# Patient Record
Sex: MALE | URBAN - METROPOLITAN AREA
[De-identification: names, ages, dates, MRNs, and addresses within clinical notes are randomized per-mention and may not be internally consistent; named-entity substitution may affect disease eponyms.]

---

## 2022-08-08 ENCOUNTER — ANESTHESIA (OUTPATIENT)
Dept: INTERVENTIONAL RADIOLOGY/VASCULAR | Age: 67
DRG: 024 | End: 2022-08-08
Payer: MEDICARE

## 2022-08-08 ENCOUNTER — APPOINTMENT (OUTPATIENT)
Dept: CT IMAGING | Age: 67
DRG: 024 | End: 2022-08-08
Payer: MEDICARE

## 2022-08-08 ENCOUNTER — ANESTHESIA EVENT (OUTPATIENT)
Dept: INTERVENTIONAL RADIOLOGY/VASCULAR | Age: 67
DRG: 024 | End: 2022-08-08
Payer: MEDICARE

## 2022-08-08 ENCOUNTER — HOSPITAL ENCOUNTER (OUTPATIENT)
Age: 67
Discharge: HOME | End: 2022-08-08
Payer: MEDICARE

## 2022-08-08 ENCOUNTER — HOSPITAL ENCOUNTER (INPATIENT)
Age: 67
LOS: 3 days | Discharge: HOME | DRG: 024 | End: 2022-08-11
Attending: EMERGENCY MEDICINE | Admitting: PSYCHIATRY & NEUROLOGY
Payer: MEDICARE

## 2022-08-08 ENCOUNTER — APPOINTMENT (OUTPATIENT)
Dept: INTERVENTIONAL RADIOLOGY/VASCULAR | Age: 67
DRG: 024 | End: 2022-08-08
Payer: MEDICARE

## 2022-08-08 DIAGNOSIS — I63.9 STROKE ABORTED BY ADMINISTRATION OF THROMBOLYTIC AGENT (HCC): ICD-10-CM

## 2022-08-08 DIAGNOSIS — I63.9 CEREBROVASCULAR ACCIDENT (CVA), UNSPECIFIED MECHANISM (HCC): Primary | ICD-10-CM

## 2022-08-08 LAB
ABO/RH: NORMAL
ABO/RH: NORMAL
ABSOLUTE EOS #: 0.15 K/UL (ref 0–0.44)
ABSOLUTE IMMATURE GRANULOCYTE: <0.03 K/UL (ref 0–0.3)
ABSOLUTE LYMPH #: 1.03 K/UL (ref 1.1–3.7)
ABSOLUTE MONO #: 0.47 K/UL (ref 0.1–1.2)
ANION GAP SERPL CALCULATED.3IONS-SCNC: 12 MMOL/L (ref 9–17)
ANTIBODY SCREEN: NEGATIVE
ARM BAND NUMBER: NORMAL
BASOPHILS # BLD: 1 % (ref 0–2)
BASOPHILS ABSOLUTE: 0.03 K/UL (ref 0–0.2)
BUN BLDV-MCNC: 15 MG/DL (ref 8–23)
CALCIUM SERPL-MCNC: 9.2 MG/DL (ref 8.6–10.4)
CHLORIDE BLD-SCNC: 106 MMOL/L (ref 98–107)
CO2: 23 MMOL/L (ref 20–31)
CREAT SERPL-MCNC: 0.84 MG/DL (ref 0.7–1.2)
EOSINOPHILS RELATIVE PERCENT: 3 % (ref 1–4)
EXPIRATION DATE: NORMAL
FIBRINOGEN: 234 MG/DL (ref 140–420)
GFR AFRICAN AMERICAN: >60 ML/MIN
GFR NON-AFRICAN AMERICAN: >60 ML/MIN
GFR SERPL CREATININE-BSD FRML MDRD: ABNORMAL ML/MIN/{1.73_M2}
GLUCOSE BLD-MCNC: 129 MG/DL (ref 70–99)
HCT VFR BLD CALC: 41.7 % (ref 40.7–50.3)
HEMOGLOBIN: 15.3 G/DL (ref 13–17)
IMMATURE GRANULOCYTES: 0 %
INR BLD: 1.1
LYMPHOCYTES # BLD: 19 % (ref 24–43)
MCH RBC QN AUTO: 37.4 PG (ref 25.2–33.5)
MCHC RBC AUTO-ENTMCNC: 36.7 G/DL (ref 28.4–34.8)
MCV RBC AUTO: 102 FL (ref 82.6–102.9)
MONOCYTES # BLD: 9 % (ref 3–12)
MYOGLOBIN: 204 NG/ML (ref 28–72)
NRBC AUTOMATED: 0 PER 100 WBC
PARTIAL THROMBOPLASTIN TIME: 22.7 SEC (ref 20.5–30.5)
PDW BLD-RTO: 13.5 % (ref 11.8–14.4)
PLATELET # BLD: 82 K/UL (ref 138–453)
PMV BLD AUTO: 9.6 FL (ref 8.1–13.5)
POTASSIUM SERPL-SCNC: 3.8 MMOL/L (ref 3.7–5.3)
PROTHROMBIN TIME: 11.6 SEC (ref 9.1–12.3)
RBC # BLD: 4.09 M/UL (ref 4.21–5.77)
SEG NEUTROPHILS: 68 % (ref 36–65)
SEGMENTED NEUTROPHILS ABSOLUTE COUNT: 3.64 K/UL (ref 1.5–8.1)
SODIUM BLD-SCNC: 141 MMOL/L (ref 135–144)
TOTAL CK: 92 U/L (ref 39–308)
TROPONIN, HIGH SENSITIVITY: <6 NG/L (ref 0–22)
WBC # BLD: 5.3 K/UL (ref 3.5–11.3)

## 2022-08-08 PROCEDURE — 2580000003 HC RX 258: Performed by: STUDENT IN AN ORGANIZED HEALTH CARE EDUCATION/TRAINING PROGRAM

## 2022-08-08 PROCEDURE — 2580000003 HC RX 258: Performed by: PSYCHIATRY & NEUROLOGY

## 2022-08-08 PROCEDURE — 6360000002 HC RX W HCPCS: Performed by: STUDENT IN AN ORGANIZED HEALTH CARE EDUCATION/TRAINING PROGRAM

## 2022-08-08 PROCEDURE — 70496 CT ANGIOGRAPHY HEAD: CPT

## 2022-08-08 PROCEDURE — 84484 ASSAY OF TROPONIN QUANT: CPT

## 2022-08-08 PROCEDURE — 2709999900 HC NON-CHARGEABLE SUPPLY

## 2022-08-08 PROCEDURE — 85730 THROMBOPLASTIN TIME PARTIAL: CPT

## 2022-08-08 PROCEDURE — 86901 BLOOD TYPING SEROLOGIC RH(D): CPT

## 2022-08-08 PROCEDURE — 82550 ASSAY OF CK (CPK): CPT

## 2022-08-08 PROCEDURE — 03CG3ZZ EXTIRPATION OF MATTER FROM INTRACRANIAL ARTERY, PERCUTANEOUS APPROACH: ICD-10-PCS | Performed by: PSYCHIATRY & NEUROLOGY

## 2022-08-08 PROCEDURE — 2500000003 HC RX 250 WO HCPCS: Performed by: PSYCHIATRY & NEUROLOGY

## 2022-08-08 PROCEDURE — C1769 GUIDE WIRE: HCPCS

## 2022-08-08 PROCEDURE — A0425 GROUND MILEAGE: HCPCS

## 2022-08-08 PROCEDURE — 99285 EMERGENCY DEPT VISIT HI MDM: CPT

## 2022-08-08 PROCEDURE — A0427 ALS1-EMERGENCY: HCPCS

## 2022-08-08 PROCEDURE — C1757 CATH, THROMBECTOMY/EMBOLECT: HCPCS

## 2022-08-08 PROCEDURE — 70450 CT HEAD/BRAIN W/O DYE: CPT

## 2022-08-08 PROCEDURE — 80048 BASIC METABOLIC PNL TOTAL CA: CPT

## 2022-08-08 PROCEDURE — 2000000000 HC ICU R&B

## 2022-08-08 PROCEDURE — 61645 PERQ ART M-THROMBECT &/NFS: CPT | Performed by: PSYCHIATRY & NEUROLOGY

## 2022-08-08 PROCEDURE — 2500000003 HC RX 250 WO HCPCS: Performed by: STUDENT IN AN ORGANIZED HEALTH CARE EDUCATION/TRAINING PROGRAM

## 2022-08-08 PROCEDURE — 85025 COMPLETE CBC W/AUTO DIFF WBC: CPT

## 2022-08-08 PROCEDURE — 3700000000 HC ANESTHESIA ATTENDED CARE

## 2022-08-08 PROCEDURE — 2580000003 HC RX 258

## 2022-08-08 PROCEDURE — 93005 ELECTROCARDIOGRAM TRACING: CPT | Performed by: STUDENT IN AN ORGANIZED HEALTH CARE EDUCATION/TRAINING PROGRAM

## 2022-08-08 PROCEDURE — 96374 THER/PROPH/DIAG INJ IV PUSH: CPT

## 2022-08-08 PROCEDURE — 2500000003 HC RX 250 WO HCPCS

## 2022-08-08 PROCEDURE — 85384 FIBRINOGEN ACTIVITY: CPT

## 2022-08-08 PROCEDURE — 82553 CREATINE MB FRACTION: CPT

## 2022-08-08 PROCEDURE — P9073 PLATELETS PHERESIS PATH REDU: HCPCS

## 2022-08-08 PROCEDURE — 37799 UNLISTED PX VASCULAR SURGERY: CPT

## 2022-08-08 PROCEDURE — 6360000004 HC RX CONTRAST MEDICATION: Performed by: PSYCHIATRY & NEUROLOGY

## 2022-08-08 PROCEDURE — 3700000001 HC ADD 15 MINUTES (ANESTHESIA)

## 2022-08-08 PROCEDURE — 83874 ASSAY OF MYOGLOBIN: CPT

## 2022-08-08 PROCEDURE — 6360000004 HC RX CONTRAST MEDICATION: Performed by: STUDENT IN AN ORGANIZED HEALTH CARE EDUCATION/TRAINING PROGRAM

## 2022-08-08 PROCEDURE — 99223 1ST HOSP IP/OBS HIGH 75: CPT | Performed by: PSYCHIATRY & NEUROLOGY

## 2022-08-08 PROCEDURE — C1887 CATHETER, GUIDING: HCPCS

## 2022-08-08 PROCEDURE — C1894 INTRO/SHEATH, NON-LASER: HCPCS

## 2022-08-08 PROCEDURE — 61645 PERQ ART M-THROMBECT &/NFS: CPT

## 2022-08-08 PROCEDURE — 86900 BLOOD TYPING SEROLOGIC ABO: CPT

## 2022-08-08 PROCEDURE — C1760 CLOSURE DEV, VASC: HCPCS

## 2022-08-08 PROCEDURE — 6360000002 HC RX W HCPCS

## 2022-08-08 PROCEDURE — 86850 RBC ANTIBODY SCREEN: CPT

## 2022-08-08 PROCEDURE — 85610 PROTHROMBIN TIME: CPT

## 2022-08-08 PROCEDURE — 36430 TRANSFUSION BLD/BLD COMPNT: CPT

## 2022-08-08 RX ORDER — SODIUM CHLORIDE 9 MG/ML
INJECTION, SOLUTION INTRAVENOUS PRN
Status: COMPLETED | OUTPATIENT
Start: 2022-08-08 | End: 2022-08-08

## 2022-08-08 RX ORDER — POLYETHYLENE GLYCOL 3350 17 G/17G
17 POWDER, FOR SOLUTION ORAL DAILY PRN
Status: CANCELLED | OUTPATIENT
Start: 2022-08-08

## 2022-08-08 RX ORDER — ONDANSETRON 4 MG/1
4 TABLET, ORALLY DISINTEGRATING ORAL EVERY 8 HOURS PRN
Status: CANCELLED | OUTPATIENT
Start: 2022-08-08

## 2022-08-08 RX ORDER — SODIUM CHLORIDE, SODIUM LACTATE, POTASSIUM CHLORIDE, CALCIUM CHLORIDE 600; 310; 30; 20 MG/100ML; MG/100ML; MG/100ML; MG/100ML
INJECTION, SOLUTION INTRAVENOUS CONTINUOUS PRN
Status: DISCONTINUED | OUTPATIENT
Start: 2022-08-08 | End: 2022-08-08 | Stop reason: SDUPTHER

## 2022-08-08 RX ORDER — IODIXANOL 320 MG/ML
100 INJECTION, SOLUTION INTRAVASCULAR
Status: COMPLETED | OUTPATIENT
Start: 2022-08-08 | End: 2022-08-08

## 2022-08-08 RX ORDER — LIDOCAINE HYDROCHLORIDE 10 MG/ML
INJECTION, SOLUTION EPIDURAL; INFILTRATION; INTRACAUDAL; PERINEURAL PRN
Status: DISCONTINUED | OUTPATIENT
Start: 2022-08-08 | End: 2022-08-08 | Stop reason: SDUPTHER

## 2022-08-08 RX ORDER — FOLIC ACID 5 MG/ML
1 INJECTION, SOLUTION INTRAMUSCULAR; INTRAVENOUS; SUBCUTANEOUS DAILY
Status: DISCONTINUED | OUTPATIENT
Start: 2022-08-08 | End: 2022-08-08

## 2022-08-08 RX ORDER — ATORVASTATIN CALCIUM 80 MG/1
80 TABLET, FILM COATED ORAL NIGHTLY
Status: CANCELLED | OUTPATIENT
Start: 2022-08-08

## 2022-08-08 RX ORDER — 0.9 % SODIUM CHLORIDE 0.9 %
INTRAVENOUS SOLUTION INTRAVENOUS CONTINUOUS PRN
Status: COMPLETED | OUTPATIENT
Start: 2022-08-08 | End: 2022-08-08

## 2022-08-08 RX ORDER — ONDANSETRON 2 MG/ML
4 INJECTION INTRAMUSCULAR; INTRAVENOUS EVERY 6 HOURS PRN
Status: CANCELLED | OUTPATIENT
Start: 2022-08-08

## 2022-08-08 RX ORDER — HYDRALAZINE HYDROCHLORIDE 20 MG/ML
10 INJECTION INTRAMUSCULAR; INTRAVENOUS EVERY 30 MIN PRN
Status: CANCELLED | OUTPATIENT
Start: 2022-08-08

## 2022-08-08 RX ORDER — SODIUM CHLORIDE 9 MG/ML
INJECTION, SOLUTION INTRAVENOUS CONTINUOUS
Status: CANCELLED | OUTPATIENT
Start: 2022-08-08

## 2022-08-08 RX ORDER — FENTANYL CITRATE 50 UG/ML
INJECTION, SOLUTION INTRAMUSCULAR; INTRAVENOUS PRN
Status: DISCONTINUED | OUTPATIENT
Start: 2022-08-08 | End: 2022-08-08 | Stop reason: SDUPTHER

## 2022-08-08 RX ORDER — LORAZEPAM 2 MG/ML
1 INJECTION INTRAMUSCULAR EVERY 4 HOURS PRN
Status: DISCONTINUED | OUTPATIENT
Start: 2022-08-08 | End: 2022-08-11 | Stop reason: HOSPADM

## 2022-08-08 RX ORDER — NICARDIPINE HYDROCHLORIDE 0.1 MG/ML
INJECTION INTRAVENOUS CONTINUOUS PRN
Status: DISCONTINUED | OUTPATIENT
Start: 2022-08-08 | End: 2022-08-08 | Stop reason: SDUPTHER

## 2022-08-08 RX ORDER — LABETALOL HYDROCHLORIDE 5 MG/ML
10 INJECTION, SOLUTION INTRAVENOUS EVERY 10 MIN PRN
Status: CANCELLED | OUTPATIENT
Start: 2022-08-08

## 2022-08-08 RX ORDER — ACETAMINOPHEN 325 MG/1
650 TABLET ORAL EVERY 4 HOURS PRN
Status: DISCONTINUED | OUTPATIENT
Start: 2022-08-08 | End: 2022-08-11 | Stop reason: HOSPADM

## 2022-08-08 RX ORDER — ONDANSETRON 4 MG/1
4 TABLET, ORALLY DISINTEGRATING ORAL EVERY 8 HOURS PRN
Status: DISCONTINUED | OUTPATIENT
Start: 2022-08-08 | End: 2022-08-11 | Stop reason: HOSPADM

## 2022-08-08 RX ORDER — ONDANSETRON 2 MG/ML
4 INJECTION INTRAMUSCULAR; INTRAVENOUS EVERY 6 HOURS PRN
Status: DISCONTINUED | OUTPATIENT
Start: 2022-08-08 | End: 2022-08-11 | Stop reason: HOSPADM

## 2022-08-08 RX ORDER — LABETALOL HYDROCHLORIDE 5 MG/ML
10 INJECTION, SOLUTION INTRAVENOUS
Status: DISCONTINUED | OUTPATIENT
Start: 2022-08-08 | End: 2022-08-11

## 2022-08-08 RX ADMIN — THIAMINE HYDROCHLORIDE 500 MG: 100 INJECTION, SOLUTION INTRAMUSCULAR; INTRAVENOUS at 22:41

## 2022-08-08 RX ADMIN — FENTANYL CITRATE 50 MCG: 50 INJECTION, SOLUTION INTRAMUSCULAR; INTRAVENOUS at 15:11

## 2022-08-08 RX ADMIN — SODIUM CHLORIDE: 9 INJECTION, SOLUTION INTRAVENOUS at 15:09

## 2022-08-08 RX ADMIN — FOLIC ACID 1 MG: 5 INJECTION, SOLUTION INTRAMUSCULAR; INTRAVENOUS; SUBCUTANEOUS at 20:42

## 2022-08-08 RX ADMIN — IODIXANOL 48 ML: 320 INJECTION, SOLUTION INTRAVASCULAR at 16:37

## 2022-08-08 RX ADMIN — Medication 2 G: at 15:26

## 2022-08-08 RX ADMIN — TENECTEPLASE 25 MG: KIT at 13:57

## 2022-08-08 RX ADMIN — FENTANYL CITRATE 50 MCG: 50 INJECTION, SOLUTION INTRAMUSCULAR; INTRAVENOUS at 16:25

## 2022-08-08 RX ADMIN — LIDOCAINE HYDROCHLORIDE 1 MG: 10 INJECTION, SOLUTION EPIDURAL; INFILTRATION; INTRACAUDAL; PERINEURAL at 15:11

## 2022-08-08 RX ADMIN — SODIUM CHLORIDE, POTASSIUM CHLORIDE, SODIUM LACTATE AND CALCIUM CHLORIDE: 600; 310; 30; 20 INJECTION, SOLUTION INTRAVENOUS at 15:09

## 2022-08-08 RX ADMIN — IOPAMIDOL 90 ML: 755 INJECTION, SOLUTION INTRAVENOUS at 14:36

## 2022-08-08 RX ADMIN — SODIUM CHLORIDE 2.5 MG/HR: 9 INJECTION, SOLUTION INTRAVENOUS at 20:47

## 2022-08-08 RX ADMIN — FENTANYL CITRATE 50 MCG: 50 INJECTION, SOLUTION INTRAMUSCULAR; INTRAVENOUS at 16:03

## 2022-08-08 RX ADMIN — Medication 10 MG: at 18:52

## 2022-08-08 RX ADMIN — SODIUM CHLORIDE 1000 ML: 9 INJECTION, SOLUTION INTRAVENOUS at 14:00

## 2022-08-08 RX ADMIN — NICARDIPINE HYDROCHLORIDE 5 MG/HR: 0.1 INJECTION INTRAVENOUS at 15:48

## 2022-08-08 RX ADMIN — FENTANYL CITRATE 50 MCG: 50 INJECTION, SOLUTION INTRAMUSCULAR; INTRAVENOUS at 15:33

## 2022-08-08 NOTE — ED NOTES
Pt. Arrives to ED via mobile stroke for c/o stroke. EMS reports that the patient was at work and went to use the restroom, when he did not return other employees went to look for him and was found on the toilet. Last known well was 12:55 TKA was administered at 1335. Upon arrival to the ED patient was flaccid on the right side, right sided mouth droop and eyes deviated to the left.   Patient was taken to [facility] upon arrival        [place], RN  08/08/22 61 [facility] JODY Oconnell  08/08/22 [id number]

## 2022-08-08 NOTE — ED PROVIDER NOTES
[facility]  Emergency Department  Faculty Attestation     I performed a history and physical examination of the patient and discussed management with the resident. I reviewed the residents note and agree with the documented findings and plan of care. Any areas of disagreement are noted on the chart. I was personally present for the key portions of any procedures. I have documented in the chart those procedures where I was not present during the key portions. I have reviewed the emergency nurses triage note. I agree with the chief complaint, past medical history, past surgical history, allergies, medications, social and family history as documented unless otherwise noted below. For Physician Assistant/ Nurse Practitioner cases/documentation I have personally evaluated this patient and have completed at least one if not all key elements of the E/M (history, physical exam, and MDM). Additional findings are as noted. Primary Care Physician:  No primary care provider on file. Screenings:  [unfilled]    CHIEF COMPLAINT       Chief Complaint   Patient presents with    Cerebrovascular Accident       RECENT VITALS:   Temp: 98.2 °F (36.8 °C),  Heart Rate: 85, Resp: 20, BP: (!) 156/90    LABS:  Labs Reviewed   STROKE PANEL       Radiology  CT HEAD WO CONTRAST   Preliminary Result   Positive dense MCA sign in the M1 segment of the left MCA. Although gray-white differentiation is grossly maintained, there is likely   acute ischemia in the left MCA territory. CT HEAD WO CONTRAST   Final Result   Question of positive dense MCA sign in the M1 segment of the left MCA. Further evaluation with CTA head is recommended. Although gray-white differentiation is grossly maintained, early acute   ischemia in the left MCA territory cannot be excluded.          CTA HEAD NECK W CONTRAST    (Results Pending)   IR ANGIOGRAM CAROTID C EREBRAL BILATERAL    (Results Pending) CRITICAL CARE: There was  a high probability of clinically significant/life threatening deterioration in this patient's condition which required my urgent intervention. Total critical care time was 5 minutes. This excludes any time for separately reportable procedures. EKG:  EKG Interpretation    Interpreted by me    Rhythm: normal sinus   Rate: normal  Axis: normal  Ectopy: none  Conduction: Slightly prolonged QTC  ST Segments: Subtle depressions inferiorly  T Waves: Inversion V2, V3  Q Waves: none    Clinical Impression: Specific ST and T wave changes    Attending Physician Additional  Notes    Patient is brought by mobile stroke unit for stroke. Last normal was sometime after noon, see their detailed chart. He has aphasia, left-sided gaze, right hemiparesis, and a stroke score of 20. He was given TNKase prior to arrival.  CT brain is without blood or edema. CT angiogram shows clot. Neurology team is here evaluating patient and will be taking patient to the Cath Lab. He has normal vital signs, he follows commands in most examples. Speech is slightly dysarthric. He has good strength left upper extremity but has difficulty counting fingers and following commands regarding squeezing. He is in no respiratory distress. EKG shows questionable ischemic changes though no STEMI. Impression is acute stroke. Plan is continue fluids, admission to neuro ICU, patient is being taken to the Cath Lab. Klely Lucas.  Linnette Kenney MD, [facility]  Attending Emergency  Physician               Nain Wynne MD  08/08/22 [id number]

## 2022-08-08 NOTE — ANESTHESIA PROCEDURE NOTES
Arterial Line:    An arterial line was placed using surface landmarks, in the OR for the following indication(s): continuous blood pressure monitoring and blood sampling needed. A 20 gauge (size), 1 and 3/4 inch (length), Arrow (type) catheter was placed, Seldinger technique used, into the right radial artery, secured by tape. Anesthesia type: General    Events:  patient tolerated procedure well with no complications. 8/8/2022 3:10 PM8/8/2022 3:15 PM  Anesthesiologist: Ceferino Vaca MD  Performed: Anesthesiologist   Preanesthetic Checklist  Completed: patient identified, IV checked, site marked, risks and benefits discussed, surgical/procedural consents, equipment checked, pre-op evaluation, timeout performed, anesthesia consent given, oxygen available, monitors applied/VS acknowledged, fire risk safety assessment completed and verbalized and blood product R/B/A discussed and consented

## 2022-08-08 NOTE — CONSULTS
Endovascular Neurosurgery Consult      Reason for evaluation: Left MCA stroke, Left MCA M-1 occlusion     SUBJECTIVE:   History of Chief Complaint:    Mr.Douglas Recardo Rubinstein is a 78 y/o M with pmh for Htn, Alcohol abuse presented with acute onset right arm, leg weakness as well as speech difficulty. Patient was apparently at his usual state of health earlier this morning. At around 12:55 PM, patient went to the restroom at his workplace, did not come out. Coworkers later found him on commode, not moving his right side and not talking. Mobile stroke unit was called, patient received IV tenecteplase in mobile stroke unit 25 mg at 1357. Patient's initial NIH stroke scale was 17. CT head was completed in mobile stroke unit, showed left MCA hyperdense sign  CTA head upon arrival, showed left M1 occlusion. Endovascular team was consulted for emergent mechanical thrombectomy. Allergies  has no allergies on file. Medications  Prior to Admission medications    Not on File    Scheduled Meds:  Continuous Infusions:  PRN Meds:.  Past Medical History   has no past medical history on file. Past Surgical History   has no past surgical history on file. Social History   has no history on file for tobacco use.   has no history on file for alcohol use.   has no history on file for drug use. Family History  family history is not on file.     Review of Systems:  CONSTITUTIONAL:  negative for fevers, chills, fatigue and malaise    EYES:  negative for double vision, blurred vision and photophobia     HEENT:  negative for tinnitus, epistaxis and sore throat    RESPIRATORY:  negative for cough, shortness of breath, wheezing    CARDIOVASCULAR:  negative for chest pain, palpitations, syncope, edema    GASTROINTESTINAL:  negative for nausea, vomiting    GENITOURINARY:  negative for incontinence    MUSCULOSKELETAL:  negative for neck or back pain    NEUROLOGICAL:  Negative for weakness and tingling  negative for headaches and dizziness    PSYCHIATRIC:  negative for anxiety      Review of systems otherwise negative. OBJECTIVE:     Vitals:    22 1442   BP: (!) 156/90   Pulse: 85   Resp: 20   Temp: 98.2 °F (36.8 °C)   SpO2: 96%        General:  Gen: normal habitus, NAD  HEENT: NCAT, mucosa moist  Cvs: RRR, S1 S2 normal  Resp: symmetric unlabored breathing  Abd: s/nd/nt  Ext: no edema  Skin: no lesions seen, warm and dry    Neuro:  Gen: awake and alert, oriented x1  Lang/speech: Mild to moderate aphasia, dysarthria  CN: PERRL, EOMI, Left gaze deviation,Right HH , V1-3 intact, Right facial droop hearing intact, shoulder shrug symmetric, tongue midline  Motor: Right UE and Le 0/5   Sense:reduced sensation touch on right UE and LE   Coord: impaired sensation on right UE and LE   DTR: deferred  Gait: deferred     NIH Stroke Scale:   1a  Level of consciousness: 0 - alert; keenly responsive   1b. LOC questions:  1 - answers one question correctly   1c. LOC commands: 1 - performs one task correctly   2. Best Gaze: 1 - partial gaze palsy   3. Visual: 1 - partial hemianopia   4. Facial Palsy: 1 - minor paralysis (flattened nasolabial fold, asymmetric on smiling)   5a. Motor left arm: 0 - no drift, limb holds 90 (or 45) degrees for full 10 seconds   5b. Motor right arm: 4 - no movement   6a. Motor left le - no drift; leg holds 30 degree position for full 5 seconds   6b  Motor right le - no movement   7. Limb Ataxia: 2 - present in two limbs   8. Sensory: 2 - severe to total sensory loss; patient is not aware of being touched in face, arm, leg   9. Best Language:  2 - severe aphasia; all communication is through fragmentary expression; great need for inference, questioning, and guessing by the listener. Range of information that can be exchanged is limited; listener carries burden of communication. Examiner cannot identify materials provided from patient response.     10. Dysarthria: 1 - mild to moderate, patient slurs at least some words and at worst, can be understood with some difficulty   11. Extinction and Inattention: 0 - no abnormality         Total:   20     MRS: 02      LABS:   Reviewed. No results found for: HGB, WBC, PLT, NA, BUN, CREATININE, AST, ALT, MG, APTT, INR   No results found for: COVID19    RADIOLOGY:   Images were personally reviewed including:  CTA head   Left MCA M-1 proximal occlusion     ASSESSMENT:   76 y/o M with pmh for Htn, Alcohol abuse presented with acute onset right arm, leg weakness as well as speech difficulty. Patient was apparently at his usual state of health earlier this morning. At around 12:55 PM, patient went to the restroom at his workplace, did not come out. Coworkers later found him on commode, not moving his right side and not talking. Mobile stroke unit was called, patient received IV tenecteplase in mobile stroke unit 25 mg at 1357. Patient's initial NIH stroke scale was 17. CT head was completed in mobile stroke unit, showed left MCA hyperdense sign  CTA head upon arrival, showed left M1 occlusion. Emergent mechanical thrombectomy was recommended. Verbal consent was obtained from wife. PLAN:   --Emergent mechanical thrombectomy   --MAC   --A line   --SBP goal < 180 mm Hg   --ICU admission post thrombectomy     Case discussed with Dr. Manpreet Dhaliwal attending.     Gunjan Guevara MD    Stroke, [facility]  [address] [place]  Electronically signed 8/8/2022 at 3:03 PM

## 2022-08-08 NOTE — ANESTHESIA POSTPROCEDURE EVALUATION
Department of Anesthesiology  Postprocedure Note    Patient: Rosendo Chauhan  MRN: [id number]  [place]: 1955  Date of evaluation: 8/8/2022      Procedure Summary     Date: 08/08/22 Room / Location: [address] [place] [address] 83 Procedures    Anesthesia Start: 6130 Anesthesia Stop: 1450    Procedure: IR ANGIOGRAM CAROTID CEREBRAL BILATERAL Diagnosis: (thrombectomy)    Scheduled Providers:  Responsible Provider: Carolyn Lyle MD    Anesthesia Type: MAC ASA Status: 4 - Emergent          Anesthesia Type: No value filed.     Kevyn Phase I:      Kevyn Phase II:        Anesthesia Post Evaluation    Patient participation: complete - patient participated  Level of consciousness: awake and alert  Nausea & Vomiting: no vomiting and no nausea  Complications: no  Cardiovascular status: hemodynamically stable  Respiratory status: nasal cannula  Hydration status: stable

## 2022-08-08 NOTE — ANESTHESIA PRE PROCEDURE
Department of Anesthesiology  Preprocedure Note       Name:  Caron Christina   Age:  77 y.o.  :  1955                                          MRN:  [id number]         Date:  2022      Surgeon: * No surgeons listed *    Procedure: * No procedures listed *    Medications prior to admission:   Prior to Admission medications    Not on File       Current medications:    No current facility-administered medications for this encounter. No current outpatient medications on file. Facility-Administered Medications Ordered in Other Encounters   Medication Dose Route Frequency Provider Last Rate Last Admin    lactated ringers infusion   IntraVENous Continuous PRN Sims Salaam, APRN - CRNA   New Bag at 22 1509    fentaNYL (SUBLIMAZE) injection   IntraVENous PRN Sims Salaam, APRN - CRNA   50 mcg at 22 1511       Allergies:  Not on File    Problem List:  There is no problem list on file for this patient. Past Medical History:  No past medical history on file. Past Surgical History:  No past surgical history on file. Social History:    Social History     Tobacco Use    Smoking status: Not on file    Smokeless tobacco: Not on file   Substance Use Topics    Alcohol use: Not on file                                Counseling given: Not Answered      Vital Signs (Current):   Vitals:    22 1442   BP: (!) 156/90   Pulse: 85   Resp: 20   Temp: 98.2 °F (36.8 °C)   TempSrc: Oral   SpO2: 96%   Weight: 250 lb (113.4 kg)   Height: 6' 1\" (1.854 m)                                              BP Readings from Last 3 Encounters:   22 (!) 156/90       NPO Status:                                                                                 BMI:   Wt Readings from Last 3 Encounters:   22 250 lb (113.4 kg)     Body mass index is 32.98 kg/m².     CBC:   Lab Results   Component Value Date/Time    WBC 5.3 2022 02:54 PM    RBC 4.09 2022 02:54 PM    HGB 15.3 2022 02:54 PM    HCT 41.7 08/08/2022 02:54 PM    .0 08/08/2022 02:54 PM    RDW 13.5 08/08/2022 02:54 PM    PLT 82 08/08/2022 02:54 PM       CMP:   Lab Results   Component Value Date/Time    NA PENDING 08/08/2022 02:54 PM    K PENDING 08/08/2022 02:54 PM    CL PENDING 08/08/2022 02:54 PM    CO2 PENDING 08/08/2022 02:54 PM    BUN PENDING 08/08/2022 02:54 PM    CREATININE PENDING 08/08/2022 02:54 PM    GFRAA PENDING 08/08/2022 02:54 PM    LABGLOM PENDING 08/08/2022 02:54 PM    GLUCOSE PENDING 08/08/2022 02:54 PM    CALCIUM PENDING 08/08/2022 02:54 PM       POC Tests: No results for input(s): POCGLU, POCNA, POCK, POCCL, POCBUN, POCHEMO, POCHCT in the last 72 hours. Coags:   Lab Results   Component Value Date/Time    PROTIME 11.6 08/08/2022 02:54 PM    INR 1.1 08/08/2022 02:54 PM    APTT 22.7 08/08/2022 02:54 PM       HCG (If Applicable): No results found for: PREGTESTUR, PREGSERUM, HCG, HCGQUANT     ABGs: No results found for: PHART, PO2ART, SSS5JZG, GXE8WSR, BEART, F8UYBXGB     Type & Screen (If Applicable):  No results found for: LABABO, LABRH    Drug/Infectious Status (If Applicable):  No results found for: HIV, HEPCAB    COVID-19 Screening (If Applicable): No results found for: COVID19        Anesthesia Evaluation    Airway: Mallampati: II  TM distance: >3 FB   Neck ROM: full  Mouth opening: > = 3 FB   Dental: normal exam         Pulmonary: breath sounds clear to auscultation                             Cardiovascular:    (+) hypertension:,         Rhythm: regular  Rate: normal                    Neuro/Psych:   (+) CVA:,             GI/Hepatic/Renal:            ROS comment: Alcohol abuse. Endo/Other:                     Abdominal:             Vascular: Other Findings:           Anesthesia Plan      MAC     ASA 4 - emergent     (Limited history due to active CVA. No PMH in chart. Proceeding with limited information)  Induction: intravenous.   arterial line  MIPS: Postoperative opioids intended and Prophylactic antiemetics administered. Anesthetic plan and risks discussed with Unable to obtain due to emergent nature. Plan discussed with CRNA.                     Geremias Diaz MD   8/8/2022

## 2022-08-08 NOTE — ED NOTES
..  8/8/2022 6:18 PM    Patient: Liam Arias, 77 y.o., male Race: Caucasion  Patient Address: [address] [place] [address] E [address]  Incident Address:  [x] [address] [facility]     [x] [facility]  [] [facility]  [] [facility]THOPEDIC AND SPINE [facility] [place]   [] KARINA ANTHONY JR. [facility]  [] [facility]  Patient Phone #: [phone]   Insurance: Payor: /   Mora Wheeler Comp:  []  Yes   [x]  No  Emergency Contact: Extended Emergency Contact Information  Primary Emergency Contact: [address] [place] [address] 252 Phone: [phone]  Mobile Phone: [phone]  Relation: Spouse  MRN: [id number]  [facility]# [id number]  YOB: 1955  Primary Care Physician: No primary care provider on file. Advance Directive: [x] Full Code   []DNR-CC   []DNR-CCA    MSU Crew: MALDONADO Fermin- CT Tech, GARO Yip - Paramedic, Gm Ellison - JODY    Pt Transported To:  [x] [place] [address]  [] [facility]  [] [facility]  [] [facility]  [] [facility]  [] [facility]  [facility] [] [facility] [] [facility]    Was patient transported to closest facility? []Yes  [x]No (if No specify reason)   []   Patient/family request    [x]   Divert to specialist       Response Code   [] 2  [x] 3  []   Change  [] 2  [] 3   Transport Code   [x] 2  [] 3  []   Change  [] 2  [] 3     Mileage:  [address] [facility] [id number]   Total Miles 9.6     Call Received 8/8/2022 1302   Dispatched 8/8/2022 1305   Enroute 8/8/2022 1307   Arrived Scene 8/8/2022 1318   At Patient 8/8/2022 1323   Decision to Scan 8/8/2022 1324   Scan 8/8/2022 1340   Departed Scene 8/8/2022 [facility]  8/8/2022 1421   Departed Hospital 8/8/2022 1443   In Service 8/8/2022 1443         Allergies  [] Updated/Reviewed by MSU  [x] Historical Data Only  [] Unavailable  has No Known Allergies.   Medications  [] Updated/Reviewed by MSU  [x] Historical Data Only  [] Unavailable  Prior to Admission medications    Not on File      Past Medical History  [] Updated/Reviewed by MSU  [x] Historical Data Only  [] Unavailable   has a past medical history of Cirrhosis of liver ([facility] [place] [address] Hepatitis C, Hyperlipidemia, and Hypertension. Patient Weight: 250 lbs   [x]   Estimated   []   Stated          VITALS          Time BP Pulse   Resp  Rate N-normal  S-shallow  D-deep Monitor SpO2 O2    LPM BGL   Temp Pain   1328 166/108 92 18 N NSR 96  NA NA   1335 ? 91 18 N NSR 96 RA      1345  80 22 N NSR 96 RA      1355  80 18 N NSR 96 RA      1400 104/70 81 18 N NSR 96 RA      1405 123/69 76 16 N NSR 96 RA      1408 126/74 73 12 N NSR 96 RA      1415 138/77 72 20 N NSR 96 RA      1418 125/77 73 22 N  RA         Pupils GCS   Time R L E  4 V  5 M  6 T  15   1323 3 3 4 3 6 13                                           NIH -   record on all transports and Q15 min after tPA administration    NIHSS       Time 1322 1400 1415    1a. LOC - Arousal Status 0 0 0    1b. LOC - Questions 2 2 2    1c. LOC - Commands 1 1 1    2. Eye Movements (gaze) 1 1 1    3. Visual Fields 1 1 1    4. Facial Palsy 2 2 2    5a. Motor Left Arm 0 0 0    5b. Motor Right Arm 3 3 3    6a. Motor Left Leg 0 0 0    6b. Motor Right Leg 3 3 3    7. Limb Ataxia 0 0 0    8. Sensory 2 2 2    9. Language/Aphasia 2 2 2    10. Dysarthria 1 1 1    11. Neglect       TOTAL 18 18 18        Research:    RACE Score: 9 Time: 1322  StrokeVAN Score: Positive Time:1322    Time Last Known Well: 7238 08/08/2022    Narrative:    Dispatched to possible CVA per [facility]. Arrived to find Soha Barone, 77 y.o., male c/o being unresponsive. Report received from [facility] EMS at patients side.  at patient side via telemedicine unit. Pt was at work and went to the bathroom when co workers noticed he had been there for awhile. When going to check on him they found the pt on the toilet and not responding. Upon our arrival with EMS pt has a facial droop on the right, deviated gaze to the left. Pt is repeating yes to all questions asked at this time. Pt does follow commands on the left when asked. Dr. Evy Carl ordered CT scan to be completed.  Once scan was completed Dr. Radha Myers informed us of findings and ordered TNK to be given. Pt meets I/E  criteria with information available to us. Multiple attempts were made to get IV access. We were able to get access to left upper arm with IV and TNK 25 mg/5ml was given @ 1357. Report given to [place] [address] ER called a RACE alert and also gave ER our ETA    Patient received the following medications prior to MSU arrival: NA  Patient has the following lines prior to MSU arrival NA  Patient has the following airway placed prior to MSU arrival: NA    Patient was transferred to cot via 2 person assist and secured with straps x3. Zoll ECG, SpO2, and NIBP applied and monitored throughout encounter. HOB maintained at 30 degrees. Patient was transferred to ambulance, cot secured in scan position. Non contrast CT scan performed. After scan cot secured in transport position. IV thrombolytic (Alteplase or Tenecteplase) inclusion/exclusion criteria reviewed with patient and physician. Candidate for IV thrombolytic therapy? [x] Yes   [] No - due to the following exclusion criteria:    [] ICH   [] Taking anticoagulant -   [] Beyond last time known well window  [] At or returned to baseline   [] Marked improvement of symptoms  [] No disabling symptoms  [] Other -     Patient is being transported to [place] [address] . During transport patient rests comfortably. Cabin temp maintained at 70-72 °F throughout transport. Patient was transferred to bed CT scan via 4 person sheet lift.   . Patient care handoff completed with Joan Mistry RN at bedside and neuro team      Imaging:  Images were obtained onboard the MSU including:  [x] CT brain without contrast - see results below:      CT HEAD WO CONTRAST    Result Date: 8/8/2022  EXAMINATION: CT OF THE HEAD WITHOUT CONTRAST  8/8/2022 2:30 pm TECHNIQUE: CT of the head was performed without the administration of intravenous contrast. Automated exposure control, iterative reconstruction, and/or weight based adjustment of the mA/kV was utilized to reduce the radiation dose to as low as reasonably achievable. COMPARISON: None. HISTORY: ORDERING SYSTEM PROVIDED HISTORY: stroke TECHNOLOGIST PROVIDED HISTORY: stroke Decision Support Exception - unselect if not a suspected or confirmed emergency medical condition->Emergency Medical Condition (MA) FINDINGS: BRAIN/VENTRICLES: There is positive dense MCA sign in the M1 segment of the left MCA. There is no acute intracranial hemorrhage, mass effect or midline shift. No abnormal extra-axial fluid collection. The gray-white differentiation is maintained without evidence of an acute infarct. There is no evidence of hydrocephalus. ORBITS: The visualized portion of the orbits demonstrate no acute abnormality. SINUSES: The visualized paranasal sinuses and mastoid air cells demonstrate no acute abnormality. SOFT TISSUES/SKULL:  No acute abnormality of the visualized skull or soft tissues. Positive dense MCA sign in the M1 segment of the left MCA, likely related to acute thrombus occlusion. Although gray-white differentiation is grossly maintained, there is likely acute ischemia in the left MCA territory. Results were reported to Dr. Josie Gaspar at 2:47 p.m. on August 8, 2022. CT HEAD WO CONTRAST    Result Date: 8/8/2022  EXAMINATION: CT OF THE HEAD WITHOUT CONTRAST  8/8/2022 1:56 pm TECHNIQUE: CT of the head was performed without the administration of intravenous contrast. Automated exposure control, iterative reconstruction, and/or weight based adjustment of the mA/kV was utilized to reduce the radiation dose to as low as reasonably achievable. COMPARISON: None.  HISTORY: ORDERING SYSTEM PROVIDED HISTORY: stroke TECHNOLOGIST PROVIDED HISTORY: stroke Decision Support Exception - unselect if not a suspected or confirmed emergency medical condition->Emergency Medical Condition (MA) FINDINGS: BRAIN/VENTRICLES: There is question of positive dense MCA sign in the M1 segment of the left MCA. There is no acute intracranial hemorrhage, mass effect or midline shift. No abnormal extra-axial fluid collection. The gray-white differentiation is maintained without evidence of an acute infarct. There is no evidence of hydrocephalus. ORBITS: The visualized portion of the orbits demonstrate no acute abnormality. SINUSES: There are retention cysts versus polyps in the bilateral maxillary sinuses. The visualized paranasal sinuses and mastoid air cells demonstrate no acute abnormality. SOFT TISSUES/SKULL:  No acute abnormality of the visualized skull or soft tissues. Question of positive dense MCA sign in the M1 segment of the left MCA. Further evaluation with CTA head is recommended. Although gray-white differentiation is grossly maintained, early acute ischemia in the left MCA territory cannot be excluded. Physical assessment performed:    Neuro: Pt is alert but aphasic. Pt keeps repeating yes to all questions asked. Pt has deviated gaze to the left. Pt has vision field cut on the right. Pt has facial droop and dysarthria when speaking. Pt is flaccid on right side. Pt following commands on left side. Pt has sensory deficit on the right.   Resp: lungs clear to auscultation bilaterally, normal effort  Cardiac: regular rate, no murmur, 12 lead ECG shows NSR  Muscular/skeletal/peripheral vascular: no edema, no redness or tenderness in the calves, pulses present in 4 extremities   Abd/GI/: abd distended large, soft, non tender, bowel sounds present x 4 quadrants   Skin: normal color, warm, diaphoretic     IV LINES   Time Size Site # Attempts Performed By   8822 # 18 gauge RAC 1 unsuccessful S Yip   1348 #18 gauge LAC 1 unsuccessful EMS   1350 #18 gauge Left forearm 1  unsuccessful S Yip   1356 # 18 gauge Left upper arm 1 EMS       Total Amount of IV Fluids Infused: 200 ml    MEDS / ELECTRICAL RX   Time Therapy Dosage Route Response Performed By   1400  0.9% NS 1 l IV SY Mc Thrombolytic AdministrationTNK    Time Bolus Dose Infusion Dose Waste   1357 5 ml     NA  NA 5 ml       [x] thrombolytic dosing double checked by: Maris Alcazar RN  S Yip Paramedic      ACUTE STROKE DYSPHAGIA SCREEN              YES NO   1 GCS less than 13?         2 Facial Asymmetry or Weakness? 3 Tongue Asymmetry or Weakness? 4 Palatal Asymmetry or Weakness? 5 Signs of aspiration during 3oz water test?*                 If answers to questions 1-4 are NO proceed to 3oz water test               *Administer 3oz of water for sequential drinks, note any throat clearing, cough, or change in vocal quality immediately after and 1 minute following the swallow. If answers to questions 1-5 are NO proceed with ordered PO meds       POC LABS      TIME NA     Test (reference range)      FSBG ()      PT (11-13.5)      INR (0.8-1.1)      Na (138-146)      K (3.5-4.9)      Cl ()      iCa (1.2-1.32)      TCO2 (24-29)      Glu ()      BUN (8.0-26)      Crea (0.6-1. 3)      Hct (38-51)      Hb (12.0-17)      AnGap 10.0-20)                Assistance:   [x]    Fire - Medic 41    []    Police    [x]    Other EMS (See Below)     # 410 hospitals Notification:    [facility] -  [x] [facility]  [] [facility]  [] [facility]  [] [facility]  [] [facility] [] [facility] [] [facility] [] [facility]  [] [employer]     [x]    Med Channel:     []    Cell Phone     Med Control Orders Received:   [x] No  []  Yes:     Med Control Physician (if on line medical direction received)  -        Hospital Team Alert:   []    Trauma Alert    []    STEMI Alert         []    Stroke Alert    [x]    RACE Alert      Description Of Valuables: glasses and drivers license    Patient Valuables:   [x]    With Patient    []    Not Received    []    ER / Lab     Call Outcome:   [x]    Transport to Facility    []    Care Transferred to [facility]    []    Cancelled    []    Patient Refusal    [] Mobile Stroke Unit    Electronically signed by Julio Russell, RN on 8/8/22 at 6:18 PM CORRINE Pino RN  08/08/22 9206

## 2022-08-08 NOTE — SEDATION DOCUMENTATION
Pt arrives to Neuro IR alert to self, follows commands on the left, facial droop, aphasia improving some, RUE/RLE no movement to pain and right neglect. NIH 15  Beck inserted without difficulty. Pedal pulses palpable and marked. Right radial art line per anesthesia.  Continue to closely monitor

## 2022-08-08 NOTE — H&P
Neuro ICU History & Physical    Patient Name: Ton Scales  Patient : 1955  Room/Bed: SHARMILA/SHARMILA  Code Status: Full code  Allergies: Not on File    CHIEF COMPLAINT     Right-sided weakness    HPI    History Obtained From: Chart    The patient is a 77 y.o. male presented with PMH of hypertension, acute onset of right arm and leg weakness along with impaired speech. Patient was last known well at approximately 1255. According to documents, patient went to the restroom at his workplace and did not come out. Workers found the patient on the commode not moving the right side and not able to communicate. Mobile stroke unit was called and patient subsequently received TKA at approximately 2 PM.  NIH was roughly 17. CTA of the head and neck showed a left 1 occlusion which was initially noted on CT head with a left hyperdense sign. Patient was taken for emergent thrombectomy. TICI 3, first-pass. Postprocedure, patient does display mild aphasia. He does also display subtle right sided weakness. No history of stroke. He is a daily smoker for over 40 years. Unclear if patient is on any antiplatelet therapy. Also does not recall what transpired and appears mildly confused. Patient is fully functional and able to perform his daily activities without any impairment. Admitted to ICU From: ER  Reason for ICU Admission: Ischemic stroke       PATIENT HISTORY   Past Medical History:    No past medical history on file. Past Surgical History:    No past surgical history on file.     Social History:   Social History     Socioeconomic History    Marital status: Single     Spouse name: Not on file    Number of children: Not on file    Years of education: Not on file    Highest education level: Not on file   Occupational History    Not on file   Tobacco Use    Smoking status: Not on file    Smokeless tobacco: Not on file   Substance and Sexual Activity    Alcohol use: Not on file    Drug use: Not on file Sexual activity: Not on file   Other Topics Concern    Not on file   Social History Narrative    Not on file     Social Determinants of Health     Financial Resource Strain: Not on file   Food Insecurity: Not on file   Transportation Needs: Not on file   Physical Activity: Not on file   Stress: Not on file   Social Connections: Not on file   Intimate Partner Violence: Not on file   Housing Stability: Not on file       Family History:   No family history on file. Allergies:    Patient has no allergy information on record. Medications Prior to Admission:    Not in a hospital admission. Current Medications:  No current facility-administered medications for this encounter. Facility-Administered Medications Ordered in Other Encounters: lactated ringers infusion, , IntraVENous, Continuous PRN  fentaNYL (SUBLIMAZE) injection, , IntraVENous, PRN  ceFAZolin (ANCEF) 2000 mg in sterile water 20 mL IV syringe, , IntraVENous, PRN  niCARdipine (CARDENE) 20 mg in 0.9 % sodium chloride 200 mL solution, , IntraVENous, Continuous PRN    REVIEW OF SYSTEMS     CONSTITUTIONAL: negative for fatigue and malaise   EYES: negative for double vision and photophobia    HEENT: negative for tinnitus and sore throat   RESPIRATORY: negative for cough, shortness of breath   CARDIOVASCULAR: negative for chest pain, palpitations, or syncope   GASTROINTESTINAL: negative for abdominal pain, nausea, vomiting, diarrhea, or constipation    GENITOURINARY: negative for incontinence or retention    MUSCULOSKELETAL: negative for neck or back pain, negative for extremity pain   NEUROLOGICAL: Negative for seizures, headaches, weakness, numbness, confusion, aphasia, no dysarthria, mild aphasia.    PSYCHIATRIC: negative for agitation, hallucination, SI/HI   SKIN Negative for spontaneous contusions, rashes, or lesions      PHYSICAL EXAM:     BP (!) 156/94   Pulse 71   Temp 98.2 °F (36.8 °C) (Oral)   Resp 28   Ht 6' 1\" (1.854 m)   Wt 250 lb (113.4 kg) seconds  5b. Motor right arm:  1 - drift, limb holds 90 (or 45) degrees but drifts down before full 10 seconds: does not hit bed  6a. Motor left le - no drift; leg holds 30 degree position for full 5 seconds  6b. Motor right le - no drift; leg holds 30 degree position for full 5 seconds  7. Limb Ataxia:  0 - absent  8. Sensory:  0 - normal; no sensory loss  9. Best Language:  1 - mild to moderate aphasia; some obvious loss of fluency or facility of comprehension without significant limitation on ideas expressed or form of expression. Reduction of speech and/or comprehension, however, makes conversation about provided materials difficult or impossible. For example, in conversation about provided materials, examiner can identify picture or naming card content from patient's response. 10.  Dysarthria:  0 - normal  11.   Extinction and Inattention:  0 - no abnormality    LABS AND IMAGING:     RECENT LABS:  CBC with Differential:    Lab Results   Component Value Date/Time    WBC 5.3 2022 02:54 PM    RBC 4.09 2022 02:54 PM    HGB 15.3 2022 02:54 PM    HCT 41.7 2022 02:54 PM    PLT 82 2022 02:54 PM    .0 2022 02:54 PM    MCH 37.4 2022 02:54 PM    MCHC 36.7 2022 02:54 PM    RDW 13.5 2022 02:54 PM    LYMPHOPCT 19 2022 02:54 PM    MONOPCT 9 2022 02:54 PM    BASOPCT 1 2022 02:54 PM    MONOSABS 0.47 2022 02:54 PM    LYMPHSABS 1.03 2022 02:54 PM    EOSABS 0.15 2022 02:54 PM    BASOSABS 0.03 2022 02:54 PM     BMP:    Lab Results   Component Value Date/Time     2022 02:54 PM    K 3.8 2022 02:54 PM     2022 02:54 PM    CO2 23 2022 02:54 PM    BUN 15 2022 02:54 PM    CREATININE 0.84 2022 02:54 PM    CALCIUM 9.2 2022 02:54 PM    GFRAA >60 2022 02:54 PM    LABGLOM >60 2022 02:54 PM    GLUCOSE 129 2022 02:54 PM       RADIOLOGY: CT Labs and Images reviewed with:    [x] Dr Kenyatta Del Rio MD    [] Burton Starks MD  [] Cherie Cedeño MD  --[] there are no new interval images to review. ASSESSMENT AND PLAN:         ASSESSMENT:     This is a 77 y.o. male with sudden onset of right-sided weakness and impaired speech. NIH was roughly 17. Mobile stroke evaluation showed a hyperdense left MCA. Patient received tPA and K after negative CT with the mobile stroke unit. Went for thrombectomy and will be proceeding to ICU after procedure. Etiology remains unclear, will continue to monitor along with continuation of stroke work-up. Patient care will be discussed with attending, will reevaluate patient along with attending. PLAN/MEDICAL DECISION MAKING:      NEUROLOGIC: Ischemic stroke s/p TKA and thrombectomy  - Imaging continue to monitor for any neurological changes  -Repeat CT in a.m.  -SBP goal   -Monitor for any neurological changes  -Stroke work-up including. -TSH/A1c/lipid profile  -Echocardiogram with bubble study  -PT/OT/SLP evaluation  -Repeat CT in a.m. and MRI in a.m.  -We will start antiplatelet therapy after 24-hour and negative imaging    CARDIOVASCULAR:  - Goal SBP 90-1 20  - Continue telemetry as directed  -Echocardiogram with bubble study  -Labetalol/hydralazine as needed if BP out of parameters  -Nicardipine drip if BP persistently elevated    PULMONARY:  -Monitor for any change in patient's respiratory status  -Baseline chest x-ray    RENAL/FLUID/ELECTROLYTE:  -Follow-up BUNs/creatinine  -Monitor urine output  -NS at 75  - Daily BMP    GI/NUTRITION:  NUTRITION:  No diet orders on file  - Bowel regimen:  In place  - GI prophylaxis:     ID:  -Monitor for hypo and hyperthermia  -Tylenol as needed if T-max greater than 101  -Panculture if febrile  - Daily CBC    HEME:   -Follow-up CBC in a.m.  -Follow-up platelet count      ENDOCRINE:  - Continue to monitor blood glucose, goal <180    OTHER:  - PT/OT/ST pending    PROPHYLAXIS:  Stress ulcer: Not indicated    DVT PROPHYLAXIS:  - SCD sleeves - Thigh High   - MICHEAL stockings - Thigh High  - No chemoprophylaxis anticoagulation at this time.     DISPOSITION: ICU      Ant Le MD  Neuro Critical Care Service   Pager [phone]  8/8/2022     4:13 PM

## 2022-08-08 NOTE — FLOWSHEET NOTE
[facility]     Emergency/Trauma Note    PATIENT NAME: Srinivasa Colbert    Shift date: 8/8/2022   Shift day: Monday   Shift # 1    Room # SHARMILA/SHARMILA   Name: Srinivasa Colbert            Age: 77 y.o. Gender: male          Scientologist: No Amish on file   Place of Orthodoxy:     Trauma/Incident type:  RACE allert  Admit Date & Time: 8/8/2022  2:23 PM  TRAUMA NAME:     ADVANCE DIRECTIVES IN CHART? No    NAME OF DECISION MAKER: Tye Denise    RELATIONSHIP OF DECISION MAKER TO PATIENT: Wife, by Kathrynokpao     PATIENT/EVENT DESCRIPTION:  Srinivasa Colbert is a 77 y.o. male who arrived via Mobile Stroke Unit from place of employment ([employer] in [place]) as a RACE alert. Pt's coworkers found him down in the bathroom. Pt to be admitted to SHARMILA/SHARMILA. SPIRITUAL ASSESSMENT-INTERVENTION-OUTCOME:   unable to assess patient.  left message for wife. PATIENT BELONGINGS:  With patient    ANY BELONGINGS OF SIGNIFICANT VALUE NOTED:      REGISTRATION STAFF NOTIFIED? No      WHAT IS YOUR SPIRITUAL CARE PLAN FOR THIS PATIENT?:   Chaplains will remain available to offer spiritual and emotional support as needed.      Electronically signed by Lisbet Monet, on 8/8/2022 at 3:11 PM.  Dejuan Rodrigez  [phone]

## 2022-08-08 NOTE — ED NOTES
Pt transported to endovascular lab on monitor with stroke team     Mary Alice Heck RN  08/08/22 2591

## 2022-08-08 NOTE — ED PROVIDER NOTES
[address]  ED  Emergency Department Encounter  Emergency Medicine Resident     Pt Name: Yanni Thornton  MRN: [id number]  [place]frush 1955  Date of evaluation: 8/8/22  PCP:  No primary care provider on file. CHIEF COMPLAINT       Chief Complaint   Patient presents with    Cerebrovascular Accident       HISTORY OFPRESENT ILLNESS  (Location/Symptom, Timing/Onset, Context/Setting, Quality, Duration, Modifying Factors,Severity.)      Yanni Thortnon is a 77 y.o. male who presents with aphasia, left-sided gaze, right hemiparesis with last known well around noon. Patient was at work and his coworker saw him going to the restroom around noon. His coworkers noticed he was in there for a long time and was not answering the door so they barged in. He was slumped over with the above symptoms. EMS was called and patient was brought in by mobile stroke unit. He was given TNKase prior to arrival after negative CT brain. We have no information on his past medical history and patient is unable to participate in providing medical history given his aphasia. PAST MEDICAL / SURGICAL / SOCIAL / FAMILY HISTORY      has a past medical history of Cirrhosis of liver ([address] Hepatitis C, Hyperlipidemia, and Hypertension. has no past surgical history on file. Social:  reports that he has been smoking cigarettes. He started smoking about 55 years ago. He has a 54.00 pack-year smoking history. He has never used smokeless tobacco. He reports current alcohol use of about 32.0 standard drinks per week. He reports current drug use. Drug: Marijuana Arielle Danielle). Family Hx:   Family History   Problem Relation Age of Onset    Heart Disease Father     Alcohol Abuse Brother         Allergies:  Patient has no known allergies. Home Medications:  Prior to Admission medications    Medication Sig Start Date End Date Taking? Authorizing Provider   aspirin 81 MG chewable tablet Take 1 tablet by mouth in the morning. 8/12/22  Yes Nghia Funez MD   atorvastatin (LIPITOR) 80 MG tablet Take 1 tablet by mouth nightly 8/11/22  Yes Nghia Funez MD   losartan (COZAAR) 50 MG tablet Take 1 tablet by mouth in the morning. 8/12/22  Yes Nghia Funez MD       REVIEW OFSYSTEMS    (2-9 systems for level 4, 10 or more for level 5)      Review of Systems   Unable to perform ROS: Patient nonverbal     PHYSICAL EXAM   (up to 7 for level 4, 8 or more forlevel 5)      INITIAL VITALS:   Vitals:    08/11/22 1640   BP:    Pulse:    Resp:    Temp: (!) 57.2 °F (14 °C)   SpO2:         Physical Exam  Vitals and nursing note reviewed. Constitutional:       General: He is not in acute distress. Appearance: Normal appearance. He is normal weight. He is not ill-appearing, toxic-appearing or diaphoretic. HENT:      Nose: Nose normal.      Mouth/Throat:      Mouth: Mucous membranes are moist.      Pharynx: Oropharynx is clear. Eyes:      Extraocular Movements: Extraocular movements intact. Conjunctiva/sclera: Conjunctivae normal.      Pupils: Pupils are equal, round, and reactive to light. Cardiovascular:      Rate and Rhythm: Normal rate and regular rhythm. Pulses: Normal pulses. Heart sounds: Normal heart sounds. Pulmonary:      Effort: Pulmonary effort is normal.      Breath sounds: Normal breath sounds. Abdominal:      General: There is no distension. Palpations: Abdomen is soft. Tenderness: There is no abdominal tenderness. There is no right CVA tenderness, left CVA tenderness or guarding. Musculoskeletal:         General: Normal range of motion. Cervical back: Normal range of motion and neck supple. Skin:     General: Skin is warm. Capillary Refill: Capillary refill takes less than 2 seconds. Neurological:      General: No focal deficit present. Mental Status: He is alert. GCS: GCS eye subscore is 4. GCS verbal subscore is 2. GCS motor subscore is 6.       Cranial Nerves: Dysarthria and facial asymmetry present. Sensory: Sensory deficit present. Motor: Weakness present. Coordination: Coordination abnormal.   Psychiatric:         Mood and Affect: Mood normal.         Behavior: Behavior normal.       MEDICAL DECISION MAKING     Initial MDM/Plan: 77 y.o. male who presents with left gaze, right upper extremity right lower extremity hemiparesis, facial droop, aphasia with last known well at noon. Brought in by mobile stroke unit, stroke alert called and patient received TNKase prior to arrival.  Initial NIH score 19. No known past medical history. Vital signs reviewed and are within normal limits although temperature is inaccurately recorded as 57.2 Fahrenheit. Physical examination as above with NIH scale below. Patient to be admitted to neuro ICU since he was given TNKase. Will obtain CT head, CTA head/neck, chest x-ray, EKG and stroke panel work-up. .    INITIAL NIH STROKE SCALE    Time Performed:  7850 PM    Administer stroke scale items in the order listed. Record performance in each category after each subscale exam. Do not go back and change scores. Follow directions provided for each exam technique. Scores should reflect what the patient does, not what the clinician thinks the patient can do. The clinician should record answers while administering the exam and work quickly. Except where indicated, the patient should not be coached (i.e., repeated requests to patient to make a special effort). 1a.  Level of consciousness:  0 - alert; keenly responsive  1b. Level of consciousness questions:  2 - answers neither question correctly  1c. Level of consciousness questions:  2 - performs neither task correctly  2. Best Gaze:  1 - partial gaze palsy  3. Visual:  0 - no visual loss  4. Facial Palsy:  2 - partial paralysis (total or near total paralysis of the lower face)  5a. Motor left arm:  0 - no drift, limb holds 90 (or 45) degrees for full 10 seconds  5b.   Motor right arm:  3 - no effort against gravity, limb falls  6a. Motor left le - no drift; leg holds 30 degree position for full 5 seconds  6b. Motor right leg:  3 - no effort against gravity; leg falls to bed immediately  7. Limb Ataxia:  1 - present in one limb  8. Sensory:  0 - normal; no sensory loss  9. Best Language:  2 - severe aphasia; all communication is through fragmentary expression; great need for inference, questioning, and guessing by the listener. Range of information that can be exchanged is limited; listener carries burden of communication. Examiner cannot identify materials provided from patient response. 10.  Dysarthria:  2 - severe; patient speech is so slurred as to be unintelligible in the absence of or our of proportion to any dysphagia, or is mute/anarthric   11.   Extinction and Inattention:  1 - visual, tactile, auditory, spatial or personal inattention or extinction to bilateral simultaneous stimulation in one of the sensory modalities     TOTAL:  19      Bruno Coma Scale  Eye Opening: Spontaneous  Best Verbal Response: Oriented  Best Motor Response: Obeys commands  [place] Coma Scale Score: 15    DIAGNOSTIC RESULTS / EMERGENCYDEPARTMENT COURSE / MDM     LABS:  Labs Reviewed   STROKE PANEL - Abnormal; Notable for the following components:       Result Value    Glucose 129 (*)     RBC 4.09 (*)     MCH 37.4 (*)     MCHC 36.7 (*)     Platelets 82 (*)     Seg Neutrophils 68 (*)     Lymphocytes 19 (*)     Absolute Lymph # 1.03 (*)     Myoglobin 204 (*)     All other components within normal limits   CBC WITH AUTO DIFFERENTIAL - Abnormal; Notable for the following components:    RBC 3.77 (*)     Hematocrit 38.6 (*)     MCH 37.7 (*)     MCHC 36.8 (*)     Platelets 89 (*)     Seg Neutrophils 69 (*)     Lymphocytes 21 (*)     All other components within normal limits   BASIC METABOLIC PANEL - Abnormal; Notable for the following components:    Glucose 121 (*)     Creatinine 0.61 (*) Calcium 8.5 (*)     All other components within normal limits   URINALYSIS WITH MICROSCOPIC - Abnormal; Notable for the following components:    Color, UA Dark Yellow (*)     Bilirubin Urine NEGATIVE  Verified by ictotest. (*)     Ketones, Urine TRACE (*)     Specific Gravity, UA 1.032 (*)     Urine Hgb MODERATE (*)     Protein, UA 1+ (*)     Leukocyte Esterase, Urine SMALL (*)     All other components within normal limits   CBC WITH AUTO DIFFERENTIAL - Abnormal; Notable for the following components:    RBC 3.75 (*)     Hematocrit 38.9 (*)     .7 (*)     MCH 36.3 (*)     MCHC 35.0 (*)     Platelets 71 (*)     All other components within normal limits   BASIC METABOLIC PANEL W/ REFLEX TO MG FOR LOW K - Abnormal; Notable for the following components:    Creatinine 0.50 (*)     Calcium 8.4 (*)     Chloride 108 (*)     Anion Gap 8 (*)     All other components within normal limits   FIBRINOGEN   HEMOGLOBIN A1C   LIPID PANEL   TYPE AND SCREEN   PREPARE PLATELETS   ABO/RH   BLOOD BANK SPECIMEN         RADIOLOGY:  MRI BRAIN W WO CONTRAST   Final Result   1. Acute infarction in the left basal ganglia. 2. Additional small foci of acute infarction involving the right frontal   lobe, bilateral parietal lobes, and left temporal lobe, suggesting an embolic   process. 3. Microangiopathic change. IR ANGIOGRAM CAROTID C EREBRAL BILATERAL         CTA HEAD NECK W CONTRAST   Final Result   Occlusion of the M1 segment of the left MCA starting from its origin, with a   few patent M2 and distal branches in the left MCA territory. This finding is   likely related to acute thrombus. Subtle decreased gray-white differentiation in the left MCA territory,   particularly in the left basal ganglia and along the left insula cortex,   likely related to acute ischemia. Otherwise, no acute abnormality or flow-limiting stenosis in the remainder of   the major arteries of the head and neck.       Partially visualized 8.2 mm lung nodule in the right upper lobe. Follow-up   recommendation is listed below. Results were reported to Dr. Parris Willson at 2:47 p.m. on August 8, 2022. RECOMMENDATIONS:   Fleischner Society guidelines for follow-up and management of incidentally   detected pulmonary nodules:      Single Solid Nodule:      Nodule size greater than 8 mm   In a low-risk patient, consider CT, PET/CT, or tissue sampling at 3 months. In a high-risk patient, consider CT, PET/CT, or tissue sampling at 3 months. - Low risk patients include individuals with minimal or absent history of   smoking and other known risk factors. - High risk patients include individuals with a history or smoking or known   risk factors. Radiology 2017 http://pubs. rsna.org/doi/full/10.1148/radiol. 3414301354         CT HEAD WO CONTRAST   Final Result   Positive dense MCA sign in the M1 segment of the left MCA, likely related to   acute thrombus occlusion. Although gray-white differentiation is grossly maintained, there is likely   acute ischemia in the left MCA territory. Results were reported to Dr. Parris Willson at 2:47 p.m. on August 8, 2022. CT HEAD WO CONTRAST   Final Result   Question of positive dense MCA sign in the M1 segment of the left MCA. Further evaluation with CTA head is recommended. Although gray-white differentiation is grossly maintained, early acute   ischemia in the left MCA territory cannot be excluded. PROCEDURES:  None    CONSULTS:  IP CONSULT TO CARDIOLOGY  IP CONSULT TO IV TEAM      FINAL IMPRESSION      1.  Cerebrovascular accident (CVA), unspecified mechanism ([address]    2. Stroke aborted by administration of thrombolytic agent [facility])          DISPOSITION / [address] Admitted 08/08/2022 03:53:35 PM      PATIENT REFERRED TO:  Marisol Willoughby MD  [address]  [phone]    Schedule an appointment as soon as possible for a visit in 2 week(s)      Hortencia Kirkland MD  [address] [place] [address],  [address]  MOB # [address] [place] [id number] [facility]    Schedule an appointment as soon as possible for a visit in 3 month(s)      [address] [address]  [phone]  Follow up      [address] [address] [phone]        DISCHARGE MEDICATIONS:  Discharge Medication List as of 8/11/2022  3:33 PM        START taking these medications    Details   aspirin 81 MG chewable tablet Take 1 tablet by mouth in the morning., Disp-30 tablet, R-3Print      atorvastatin (LIPITOR) 80 MG tablet Take 1 tablet by mouth nightly, Disp-30 tablet, R-0Print      losartan (COZAAR) 50 MG tablet Take 1 tablet by mouth in the morning., Disp-30 tablet, R-0Print             Lay Oliveira MD  Emergency Medicine Resident    (Please note that portions of this note were completed with a voice recognition program.Efforts were made to edit the dictations but occasionally words are mis-transcribed.)        Lay Oliveira MD  Resident  08/17/22 0745

## 2022-08-08 NOTE — CONSULTS
Social Connections: Not on file   Intimate Partner Violence: Not on file   Housing Stability: Not on file       Family History:   No family history on file. Allergies:  Patient has no allergy information on record. Home Medications:  Prior to Admission medications    Not on File       Current Medications:   No current facility-administered medications for this encounter. Facility-Administered Medications Ordered in Other Encounters: lactated ringers infusion, , IntraVENous, Continuous PRN  fentaNYL (SUBLIMAZE) injection, , IntraVENous, PRN  ceFAZolin (ANCEF) 2000 mg in sterile water 20 mL IV syringe, , IntraVENous, PRN  niCARdipine (CARDENE) 20 mg in 0.9 % sodium chloride 200 mL solution, , IntraVENous, Continuous PRN    REVIEW OF SYSTEMS:     Unable to assess due to patient mentation    PHYSICAL EXAM:       BP (!) 156/94   Pulse 71   Temp 98.2 °F (36.8 °C) (Oral)   Resp 28   Ht 6' 1\" (1.854 m)   Wt 250 lb (113.4 kg)   SpO2 96%   BMI 32.98 kg/m²     General:  Gen: normal habitus, NAD  HEENT: NCAT, mucosa moist  Cvs: RRR, S1 S2 normal  Resp: symmetric unlabored breathing  Abd: s/nd/nt  Ext: no edema  Skin: no lesions seen, warm and dry     Neuro:  Gen: awake and alert, oriented x1  Lang/speech: Mild to moderate aphasia, dysarthria  CN: PERRL, EOMI, Left gaze deviation,Right HH , V1-3 intact, Right facial droop hearing intact, shoulder shrug symmetric, tongue midline  Motor: Right UE and Le 0/5   Sense:reduced sensation touch on right UE and LE   Coord: impaired sensation on right UE and LE   DTR: deferred  Gait: deferred      NIH Stroke Scale:  1a Level of consciousness: 0 - alert; keenly responsive   1b. LOC questions:  1 - answers one question correctly   1c. LOC commands: 1 - performs one task correctly   2. Best Gaze: 1 - partial gaze palsy   3. Visual: 1 - partial hemianopia   4. Facial Palsy: 1 - minor paralysis (flattened nasolabial fold, asymmetric on smiling)   5a.  Motor left arm: 0 - no drift, limb holds 90 (or 45) degrees for full 10 seconds   5b. Motor right arm: 4 - no movement   6a. Motor left le - no drift; leg holds 30 degree position for full 5 seconds   6b Motor right le - no movement   7. Limb Ataxia: 2 - present in two limbs   8. Sensory: 2 - severe to total sensory loss; patient is not aware of being touched in face, arm, leg   9. Best Language:  2 - severe aphasia; all communication is through fragmentary expression; great need for inference, questioning, and guessing by the listener. Range of information that can be exchanged is limited; listener carries burden of communication. Examiner cannot identify materials provided from patient response. 10. Dysarthria: 1 - mild to moderate, patient slurs at least some words and at worst, can be understood with some difficulty   11.  Extinction and Inattention: 0 - no abnormality             Total:   20      MRS: 02      LABS AND IMAGING:     CBC with Differential:    Lab Results   Component Value Date/Time    WBC 5.3 2022 02:54 PM    RBC 4.09 2022 02:54 PM    HGB 15.3 2022 02:54 PM    HCT 41.7 2022 02:54 PM    PLT 82 2022 02:54 PM    .0 2022 02:54 PM    MCH 37.4 2022 02:54 PM    MCHC 36.7 2022 02:54 PM    RDW 13.5 2022 02:54 PM    LYMPHOPCT 19 2022 02:54 PM    MONOPCT 9 2022 02:54 PM    BASOPCT 1 2022 02:54 PM    MONOSABS 0.47 2022 02:54 PM    LYMPHSABS 1.03 2022 02:54 PM    EOSABS 0.15 2022 02:54 PM    BASOSABS 0.03 2022 02:54 PM     BMP:    Lab Results   Component Value Date/Time     2022 02:54 PM    K 3.8 2022 02:54 PM     2022 02:54 PM    CO2 23 2022 02:54 PM    BUN 15 2022 02:54 PM    CREATININE 0.84 2022 02:54 PM    CALCIUM 9.2 2022 02:54 PM    GFRAA >60 2022 02:54 PM    LABGLOM >60 2022 02:54 PM    GLUCOSE 129 2022 02:54 PM         ASSESSMENT AND PLAN: Patient Active Problem List   Diagnosis    Stroke aborted by administration of thrombolytic agent [facility])       70-year-old male with PMH of HTN, alcohol abuse, presented with acute onset right arm, leg weakness as well as speech difficulty, last known well was 12:55 PM, went to restroom at his workplace, did not came out, coworkers later found him on commode, not moving his right side, not talking, mobile stroke unit was called, patient received TN K at 0, NIH was 16 at that time completed and mobile stroke unit, CT showed left MCA hyperdense sign, CTA head and neck: Showed left M1 occlusion, emergent thrombectomy was recommended, verbal consent was obtained from wife    Case discussed with Dr. Krys Allen, emergent mechanical thrombectomy, A-line, SBP<180, ICU admission post thrombectomy        Sabrina Madera MD   8/8/2022  4:14 PM

## 2022-08-08 NOTE — BRIEF OP NOTE
Brief Postoperative Note                  [facility]    NEUROENDOVASCULAR SERVICE: POST-OP NOTE: 8/8/2022    Pt Name: Soha Barone  MRN: [id number]  [place]: 1955  Date of Procedure: 8/8/2022  Primary Care Physician: No primary care provider on file. Referring Marley Espana MD      Pre-Procedural Diagnosis:Left MCA ischemic stroke, Left M-1 occlusion   Post-Procedural Diagnosis:Same as above       Procedure Performed:Cerebral angiogram with mechanical thrombectomy of the Left MCA M-1 segment     Surgeon:   Audra Benito MD    Fellow:  Gunjan Guevara MD     Assisting Tech:  Rosamaria Song    PRE-PROCEDURAL EXAM:  Prestroke baseline mRS MODIFIED LANEY SCORE: 0 - No symptoms at all.  , \"Neurological exam performed and unchanged from initial H&P or consult\", \"MODIFIED LANEY SCORE: 4 - Moderate severe disability:  unable to walk or attend to own bodily needs without assistance. \"  CT head ASPECT score: 8    Anesthesia: MAC anesthesia  Complications: none    Intra-Operative EXAM:  Patient's neurological exam significantly improved, aphasia improved, right UE and LE strength 4/5     EBL: < less than 50       Cc            Specimens: Were not Obtained  Contrast:     Visipaque 320 low osmolar 48 Cc             Fluoro: 21.7 min    Findings:  Please see dictated Radiology note for further details  Left MCA proximal M-1 occlusion TICI score 0   The above lesion was treated with 8 fr short sheath, Emboguard BGC, Rebar microcatheter and Embo trap 6.5 mm x 45 mm with mechanical aspiration using Penumbra engine, 1 Pass   Post thrombectomy Left ICA vasospasm was treated with IA nitroglycerine, IA nicardipine gtt, TICI score-03       TICI score: 3      POST-PROCEDURAL EXAM :   Stable neurological Exam  Neuro exam was significantly improved    Closure:  Right CFA short sheath was sutured in         POST-PROCEDURAL MONITORING : see orders  Disposition: Neuro ICU      Recommendations:  Back to Neuro ICU  Do not bend right leg for 3 hours. Groin checks per protocol. Peripheral pulse checks per protocol. SBP goal  mm hg   Follow up with Rivera Goldstein MD  2 weeks after discharge and Dr. José Garcia 3 months after discharge.         MD Jenni Hadley MD   Pager: [phone]  Stroke, [facility]  [facility] [address]  Electronically signed 8/8/2022 at 4:49 PM

## 2022-08-09 ENCOUNTER — APPOINTMENT (OUTPATIENT)
Dept: MRI IMAGING | Age: 67
DRG: 024 | End: 2022-08-09
Payer: MEDICARE

## 2022-08-09 PROBLEM — I63.9 CEREBROVASCULAR ACCIDENT (CVA) (HCC): Status: ACTIVE | Noted: 2022-08-09

## 2022-08-09 LAB
ABSOLUTE EOS #: 0.12 K/UL (ref 0–0.44)
ABSOLUTE IMMATURE GRANULOCYTE: <0.03 K/UL (ref 0–0.3)
ABSOLUTE LYMPH #: 1.53 K/UL (ref 1.1–3.7)
ABSOLUTE MONO #: 0.56 K/UL (ref 0.1–1.2)
ANION GAP SERPL CALCULATED.3IONS-SCNC: 14 MMOL/L (ref 9–17)
BASOPHILS # BLD: 0 % (ref 0–2)
BASOPHILS ABSOLUTE: <0.03 K/UL (ref 0–0.2)
BILIRUBIN URINE: ABNORMAL
BLD PROD TYP BPU: NORMAL
BLOOD BANK BLOOD PRODUCT EXPIRATION DATE: NORMAL
BLOOD BANK ISBT PRODUCT BLOOD TYPE: 5100
BLOOD BANK PRODUCT CODE: NORMAL
BLOOD BANK UNIT TYPE AND RH: NORMAL
BPU ID: NORMAL
BUN BLDV-MCNC: 13 MG/DL (ref 8–23)
CALCIUM SERPL-MCNC: 8.5 MG/DL (ref 8.6–10.4)
CASTS UA: ABNORMAL /LPF (ref 0–8)
CHLORIDE BLD-SCNC: 106 MMOL/L (ref 98–107)
CHOLESTEROL/HDL RATIO: 2.8
CHOLESTEROL: 149 MG/DL
CO2: 20 MMOL/L (ref 20–31)
COLOR: ABNORMAL
CREAT SERPL-MCNC: 0.61 MG/DL (ref 0.7–1.2)
DISPENSE STATUS BLOOD BANK: NORMAL
EKG ATRIAL RATE: 69 BPM
EKG P AXIS: 67 DEGREES
EKG P-R INTERVAL: 150 MS
EKG Q-T INTERVAL: 438 MS
EKG QRS DURATION: 90 MS
EKG QTC CALCULATION (BAZETT): 469 MS
EKG R AXIS: 48 DEGREES
EKG T AXIS: 66 DEGREES
EKG VENTRICULAR RATE: 69 BPM
EOSINOPHILS RELATIVE PERCENT: 2 % (ref 1–4)
EPITHELIAL CELLS UA: ABNORMAL /HPF (ref 0–5)
GFR AFRICAN AMERICAN: >60 ML/MIN
GFR NON-AFRICAN AMERICAN: >60 ML/MIN
GFR SERPL CREATININE-BSD FRML MDRD: ABNORMAL ML/MIN/{1.73_M2}
GLUCOSE BLD-MCNC: 121 MG/DL (ref 70–99)
GLUCOSE URINE: NEGATIVE
HCT VFR BLD CALC: 38.6 % (ref 40.7–50.3)
HDLC SERPL-MCNC: 53 MG/DL
HEMOGLOBIN: 14.2 G/DL (ref 13–17)
IMMATURE GRANULOCYTES: 0 %
KETONES, URINE: ABNORMAL
LDL CHOLESTEROL: 83 MG/DL (ref 0–130)
LEUKOCYTE ESTERASE, URINE: ABNORMAL
LYMPHOCYTES # BLD: 21 % (ref 24–43)
MCH RBC QN AUTO: 37.7 PG (ref 25.2–33.5)
MCHC RBC AUTO-ENTMCNC: 36.8 G/DL (ref 28.4–34.8)
MCV RBC AUTO: 102.4 FL (ref 82.6–102.9)
MONOCYTES # BLD: 8 % (ref 3–12)
NITRITE, URINE: NEGATIVE
NRBC AUTOMATED: 0 PER 100 WBC
PDW BLD-RTO: 13.5 % (ref 11.8–14.4)
PH UA: 5.5 (ref 5–8)
PLATELET # BLD: 89 K/UL (ref 138–453)
PMV BLD AUTO: 10 FL (ref 8.1–13.5)
POTASSIUM SERPL-SCNC: 3.8 MMOL/L (ref 3.7–5.3)
PROTEIN UA: ABNORMAL
RBC # BLD: 3.77 M/UL (ref 4.21–5.77)
RBC UA: ABNORMAL /HPF (ref 0–4)
SEG NEUTROPHILS: 69 % (ref 36–65)
SEGMENTED NEUTROPHILS ABSOLUTE COUNT: 5.07 K/UL (ref 1.5–8.1)
SODIUM BLD-SCNC: 140 MMOL/L (ref 135–144)
SPECIFIC GRAVITY UA: 1.03 (ref 1–1.03)
TRANSFUSION STATUS: NORMAL
TRIGL SERPL-MCNC: 66 MG/DL
TURBIDITY: CLEAR
UNIT DIVISION: 0
UNIT ISSUE DATE/TIME: NORMAL
URINE HGB: ABNORMAL
UROBILINOGEN, URINE: NORMAL
WBC # BLD: 7.3 K/UL (ref 3.5–11.3)
WBC UA: ABNORMAL /HPF (ref 0–5)

## 2022-08-09 PROCEDURE — 6370000000 HC RX 637 (ALT 250 FOR IP): Performed by: PSYCHIATRY & NEUROLOGY

## 2022-08-09 PROCEDURE — C1769 GUIDE WIRE: HCPCS

## 2022-08-09 PROCEDURE — 2500000003 HC RX 250 WO HCPCS: Performed by: PSYCHIATRY & NEUROLOGY

## 2022-08-09 PROCEDURE — 2500000003 HC RX 250 WO HCPCS: Performed by: STUDENT IN AN ORGANIZED HEALTH CARE EDUCATION/TRAINING PROGRAM

## 2022-08-09 PROCEDURE — 99233 SBSQ HOSP IP/OBS HIGH 50: CPT | Performed by: PSYCHIATRY & NEUROLOGY

## 2022-08-09 PROCEDURE — 2580000003 HC RX 258: Performed by: PSYCHIATRY & NEUROLOGY

## 2022-08-09 PROCEDURE — 70553 MRI BRAIN STEM W/O & W/DYE: CPT

## 2022-08-09 PROCEDURE — 99291 CRITICAL CARE FIRST HOUR: CPT | Performed by: PSYCHIATRY & NEUROLOGY

## 2022-08-09 PROCEDURE — 85025 COMPLETE CBC W/AUTO DIFF WBC: CPT

## 2022-08-09 PROCEDURE — 6360000002 HC RX W HCPCS: Performed by: STUDENT IN AN ORGANIZED HEALTH CARE EDUCATION/TRAINING PROGRAM

## 2022-08-09 PROCEDURE — 2580000003 HC RX 258: Performed by: STUDENT IN AN ORGANIZED HEALTH CARE EDUCATION/TRAINING PROGRAM

## 2022-08-09 PROCEDURE — A9579 GAD-BASE MR CONTRAST NOS,1ML: HCPCS | Performed by: STUDENT IN AN ORGANIZED HEALTH CARE EDUCATION/TRAINING PROGRAM

## 2022-08-09 PROCEDURE — 81001 URINALYSIS AUTO W/SCOPE: CPT

## 2022-08-09 PROCEDURE — 80048 BASIC METABOLIC PNL TOTAL CA: CPT

## 2022-08-09 PROCEDURE — 80061 LIPID PANEL: CPT

## 2022-08-09 PROCEDURE — 83036 HEMOGLOBIN GLYCOSYLATED A1C: CPT

## 2022-08-09 PROCEDURE — 6360000002 HC RX W HCPCS

## 2022-08-09 PROCEDURE — 2709999900 HC NON-CHARGEABLE SUPPLY

## 2022-08-09 PROCEDURE — 93010 ELECTROCARDIOGRAM REPORT: CPT | Performed by: INTERNAL MEDICINE

## 2022-08-09 PROCEDURE — 6360000004 HC RX CONTRAST MEDICATION: Performed by: STUDENT IN AN ORGANIZED HEALTH CARE EDUCATION/TRAINING PROGRAM

## 2022-08-09 PROCEDURE — 2000000000 HC ICU R&B

## 2022-08-09 RX ORDER — LOSARTAN POTASSIUM 50 MG/1
50 TABLET ORAL DAILY
Status: DISCONTINUED | OUTPATIENT
Start: 2022-08-09 | End: 2022-08-11 | Stop reason: HOSPADM

## 2022-08-09 RX ORDER — ATORVASTATIN CALCIUM 80 MG/1
80 TABLET, FILM COATED ORAL NIGHTLY
Status: DISCONTINUED | OUTPATIENT
Start: 2022-08-09 | End: 2022-08-11 | Stop reason: HOSPADM

## 2022-08-09 RX ORDER — FENTANYL CITRATE 50 UG/ML
50 INJECTION, SOLUTION INTRAMUSCULAR; INTRAVENOUS ONCE
Status: COMPLETED | OUTPATIENT
Start: 2022-08-09 | End: 2022-08-09

## 2022-08-09 RX ORDER — FENTANYL CITRATE 50 UG/ML
INJECTION, SOLUTION INTRAMUSCULAR; INTRAVENOUS
Status: COMPLETED
Start: 2022-08-09 | End: 2022-08-09

## 2022-08-09 RX ORDER — SODIUM CHLORIDE 0.9 % (FLUSH) 0.9 %
10 SYRINGE (ML) INJECTION PRN
Status: DISCONTINUED | OUTPATIENT
Start: 2022-08-09 | End: 2022-08-11 | Stop reason: HOSPADM

## 2022-08-09 RX ADMIN — Medication 2000 MG: at 10:42

## 2022-08-09 RX ADMIN — Medication 10 MG: at 18:19

## 2022-08-09 RX ADMIN — FENTANYL CITRATE 50 MCG: 50 INJECTION, SOLUTION INTRAMUSCULAR; INTRAVENOUS at 09:41

## 2022-08-09 RX ADMIN — SODIUM CHLORIDE 10 MG/HR: 9 INJECTION, SOLUTION INTRAVENOUS at 16:00

## 2022-08-09 RX ADMIN — SODIUM CHLORIDE 7.5 MG/HR: 9 INJECTION, SOLUTION INTRAVENOUS at 00:54

## 2022-08-09 RX ADMIN — SODIUM CHLORIDE 15 MG/HR: 9 INJECTION, SOLUTION INTRAVENOUS at 22:00

## 2022-08-09 RX ADMIN — FOLIC ACID 1 MG: 5 INJECTION, SOLUTION INTRAMUSCULAR; INTRAVENOUS; SUBCUTANEOUS at 09:33

## 2022-08-09 RX ADMIN — SODIUM CHLORIDE 7.5 MG/HR: 9 INJECTION, SOLUTION INTRAVENOUS at 04:47

## 2022-08-09 RX ADMIN — THIAMINE HYDROCHLORIDE 500 MG: 100 INJECTION, SOLUTION INTRAMUSCULAR; INTRAVENOUS at 19:48

## 2022-08-09 RX ADMIN — LOSARTAN POTASSIUM 50 MG: 50 TABLET, FILM COATED ORAL at 10:42

## 2022-08-09 RX ADMIN — Medication 10 MG: at 12:14

## 2022-08-09 RX ADMIN — GADOTERIDOL 20 ML: 279.3 INJECTION, SOLUTION INTRAVENOUS at 15:02

## 2022-08-09 RX ADMIN — SODIUM CHLORIDE 12.5 MG/HR: 9 INJECTION, SOLUTION INTRAVENOUS at 09:34

## 2022-08-09 RX ADMIN — SODIUM CHLORIDE 14.5 MG/HR: 9 INJECTION, SOLUTION INTRAVENOUS at 19:39

## 2022-08-09 RX ADMIN — SODIUM CHLORIDE 15 MG/HR: 9 INJECTION, SOLUTION INTRAVENOUS at 12:15

## 2022-08-09 RX ADMIN — ATORVASTATIN CALCIUM 80 MG: 80 TABLET, FILM COATED ORAL at 19:39

## 2022-08-09 NOTE — PROCEDURES
Procedure Note      Date: 8/9/2022 11:27 AM  Procedure: right groin access closure  Proceduralist: Elsa López    Description:  Patient identity and procedure site confirmed in time out. Antibiotics (Ancef 2g IV) and pain medication (fentanyl 50ug iv) were given prior to procedure. The right groin was cleaned and draped in a sterile fashion. The sheath was then removed from the right groin and an Angiosesal 8F device was used to close the access site. Pressure was held for 20 minutes to achieve hemostasis. The wound was covered with QuickClot, gauze, Tegaderm, and safeguard. Patient tolerated the procedure well with no complications.     Complications:  None    Blood loss:  Minimal    Elsa López MD  Stroke, [facility]  [address]  Electronically signed 8/9/2022 at 11:27 AM

## 2022-08-09 NOTE — PLAN OF CARE
Problem: Discharge Planning  Goal: Discharge to home or other facility with appropriate resources  Outcome: Progressing  Discharge pending medical course  Flowsheets (Taken 8/8/2022 1930)  Discharge to home or other facility with appropriate resources:   Identify barriers to discharge with patient and caregiver   Arrange for needed discharge resources and transportation as appropriate   Arrange for interpreters to assist at discharge as needed   Refer to discharge planning if patient needs post-hospital services based on physician order or complex needs related to functional status, cognitive ability or social support system     Problem: Safety - Adult  Goal: Free from fall injury  Outcome: Progressing  Pt remains free from falls this shift     Problem: ABCDS Injury Assessment  Goal: Absence of physical injury  Outcome: Progressing  Pt free from new injuries this shift     Problem: Skin/Tissue Integrity  Goal: Absence of new skin breakdown  Description: 1. Monitor for areas of redness and/or skin breakdown  2. Assess vascular access sites hourly  3. Every 4-6 hours minimum:  Change oxygen saturation probe site  4. Every 4-6 hours:  If on nasal continuous positive airway pressure, respiratory therapy assess nares and determine need for appliance change or resting period.   Outcome: Progressing  No new skin breakdown this shift

## 2022-08-09 NOTE — CARE COORDINATION
PHQ-9  Pt presents with right -sided weakness, CVA  Met with pt this date was alert and oriented  Pt states he lives with his wife. Has 1 son. Pt denies having any symptoms/feeling of depression or suicidal ideations. Patient Health Questionnaire-9 (PHQ-9)    Over the last 2 weeks, how often have you been bothered by any of the following problems? 1. Little Interest or pleasure in doing things? [x] Not at all  [] Several Days  [] More than half the day  []  Nearly every day    2. Feeling down, depressed or hopeless? [x] Not at all  [] Several Days  [] More than half the day  []  Nearly every day    3. Trouble falling or staying asleep, or sleeping too much? [x] Not at all  [] Several Days  [] More than half the day  []  Nearly every day    4. Feeling tired or having little energy? [x] Not at all  [] Several Days  [] More than half the day  []  Nearly every day    5. Poor apettite or overeating? [x] Not at all  [] Several Days  [] More than half the day  []  Nearly every day    6. Feeling bad about yourself-or that you are a failure or have let yourself or your family down? [x] Not at all  [] Several Days  [] More than half the day  []  Nearly every day    7. Trouble concentrating on things, such as reading the newspaper or watching television? [x] Not at all  [] Several Days  [] More than half the day  []  Nearly every day    8. Moving or speaking so slowly that other people could have noticed? Or the opposite-being so fidgety or restless that you have been moving around a lot more than usual?   [x] Not at all  [] Several Days  [] More than half the day  []  Nearly every day    9. Thoughts that you would be better off dead or of hurting yourself in some way?    [x] Not at all  [] Several Days  [] More than half the day  []  Nearly every day    Total Score: 0    If you checked off any problems, how difficult have these problems made it for you to do your work, take care of things at home, or get along with other people?    [x] Not difficult at all  [] Somewhat Difficult  [] Very Difficult  []  Extremely Difficult

## 2022-08-09 NOTE — PROGRESS NOTES
Endovascular Neurosurgery Progress Note    SUBJECTIVE:   No acute events overnight, patient denied any headache overnight     Review of Systems:  CONSTITUTIONAL:  negative for fevers, chills, fatigue and malaise    EYES:  negative for double vision, blurred vision and photophobia     HEENT:  negative for tinnitus, epistaxis and sore throat    RESPIRATORY:  negative for cough, shortness of breath, wheezing    CARDIOVASCULAR:  negative for chest pain, palpitations, syncope, edema    GASTROINTESTINAL:  negative for nausea, vomiting    GENITOURINARY:  negative for incontinence    MUSCULOSKELETAL:  negative for neck or back pain    NEUROLOGICAL:  Negative for weakness and tingling  negative for headaches and dizziness    PSYCHIATRIC:  negative for anxiety      Review of systems otherwise negative. OBJECTIVE:     Vitals:    08/09/22 0700   BP: 131/67   Pulse: 75   Resp: 20   Temp:    SpO2: 93%        General:  Gen: normal habitus, NAD  HEENT: NCAT, mucosa moist  Cvs: RRR, S1 S2 normal  Resp: symmetric unlabored breathing  Abd: s/nd/nt  Ext: no edema  Skin: no lesions seen, warm and dry    Neuro:  Gen: awake and alert, oriented x3. Lang/speech: some impaired fluency, no aphasia or dysarthria. Follows commands. CN: PERRL, EOMI, VFF, V1-3 intact,Right facial nasolabial fold flattening, hearing intact, shoulder shrug symmetric, tongue midline  Motor: Right UE and LE 4/5, Left UE and LE 5/5   Sense: LT intact in all 4 ext. Coord: FTN and HTS intact b/l  DTR: deferred  Gait: narrow base gait    NIH Stroke Scale:   1a  Level of consciousness: 0 - alert; keenly responsive   1b. LOC questions:  0 - answers both questions correctly   1c. LOC commands: 0 - performs both tasks correctly   2. Best Gaze: 0 - normal   3. Visual: 0 - no visual loss   4. Facial Palsy: 1 - minor paralysis (flattened nasolabial fold, asymmetric on smiling)   5a.  Motor left arm: 0 - no drift, limb holds 90 (or 45) degrees for full 10 seconds 5b.  Motor right arm: 1 - drift, limb holds 90 (or 45) degrees but drifts down before full 10 seconds: does not hit bed   6a. Motor left le - no drift; leg holds 30 degree position for full 5 seconds   6b  Motor right le - drift; leg falls by the end of the 5 second period but does not hit bed   7. Limb Ataxia: 0 - absent   8. Sensory: 0 - normal; no sensory loss   9. Best Language:  0 - no aphasia, normal   10. Dysarthria: 1 - mild to moderate, patient slurs at least some words and at worst, can be understood with some difficulty   11. Extinction and Inattention: 0 - no abnormality         Total:   4     MRS: 04      LABS:   Reviewed. Lab Results   Component Value Date    HGB 15.3 2022    WBC 5.3 2022    PLT 82 (L) 2022     2022    BUN 15 2022    CREATININE 0.84 2022    APTT 22.7 2022    INR 1.1 2022      No results found for: COVID19    RADIOLOGY:   Images were personally reviewed including:   IR:   Left MCA proximal M-1 occlusion TICI score 0  The above lesion was treated with 8 fr short sheath, Emboguard BGC, Rebar microcatheter and Embo trap 6.5 mm x 45 mm with mechanical aspiration using Penumbra engine, 1 Pass  Post thrombectomy Left ICA vasospasm was treated with IA nitroglycerine, IA nicardipine gtt, TICI score-03  CTA head and neck:   Occlusion of the M1 segment of the left MCA starting from its origin, with a   few patent M2 and distal branches in the left MCA territory. This finding is   likely related to acute thrombus. Subtle decreased gray-white differentiation in the left MCA territory,   particularly in the left basal ganglia and along the left insula cortex,   likely related to acute ischemia. Otherwise, no acute abnormality or flow-limiting stenosis in the remainder of   the major arteries of the head and neck. Partially visualized 8.2 mm lung nodule in the right upper lobe.   Follow-up   recommendation is listed below.     ASSESSMENT:   76 y/o M with pmh for Htn, Alcohol abuse presented with acute onset right arm, leg weakness as well as speech difficulty, initial NIHSS was 17, received IV TNK, CTA head showed left proximal M-1 occlusion, s/p emergent MT on 08/08 with final TICI score 03, left ICA vasospasm treated with IA nitroglycerine. Neuro exam significantly improved post-procedure, NIHSS this am 04. Etiology of stroke unclear: cardioemboic vs hypercoagulability. Thrombocytopenia   PLAN:   --post-TNK protocol per ICU   --Further stroke work up with MRI brain, TTE  --Short sheath removal today   --SBP goal  mm Hg. PRN radha     Case discussed with Dr. Rupal Kinney attending.     Hal Burgess MD  Stroke, [facility]  [id number]  Electronically signed 8/9/2022 at 8:40 AM

## 2022-08-09 NOTE — PROGRESS NOTES
Daily Progress Note  Neuro Critical Care    Patient Name: Sunshine Mendoza  Patient : 1955  Room/Bed: 0128/0128-01  Code Status: Full Code  Allergies: No Known Allergies    CHIEF COMPLAINT:     Slurred speech     INTERVAL HISTORY    Initial Presentation (Admitted ): The patient is a 77 y.o. male presented with PMH of hypertension, acute onset of right arm and leg weakness along with impaired speech. Patient was last known well at approximately 1255. According to documents, patient went to the restroom at his workplace and did not come out. Workers found the patient on the commode not moving the right side and not able to communicate. Mobile stroke unit was called and patient subsequently received TKA at approximately 2 PM.  NIH was roughly 17. CTA of the head and neck showed a left 1 occlusion which was initially noted on CT head with a left hyperdense sign. Patient was taken for emergent thrombectomy. TICI 3, first-pass. Postprocedure, patient does display mild aphasia. He does also display subtle right sided weakness. No history of stroke. He is a daily smoker for over 40 years. Unclear if patient is on any antiplatelet therapy. Also does not recall what transpired and appears mildly confused. Patient is fully functional and able to perform his daily activities without any impairment. Hospital Course:   : emergent thrombectomy left MCA M1 segment based on CTA with left occlusion. Received TNk. Last 24h:  No acute events overnight. Plan to remove sheath today.     CURRENT MEDICATIONS:  SCHEDULED MEDICATIONS:   atorvastatin  80 mg Oral Nightly    folic acid IVPB  1 mg IntraVENous Daily    thiamine (VITAMIN B1) IVPB  500 mg IntraVENous Q24H     CONTINUOUS INFUSIONS:   niCARdipine 7.5 mg/hr (22 0655)     PRN MEDICATIONS:   acetaminophen, ondansetron **OR** ondansetron, labetalol, LORazepam    VITALS:  Temperature Range: Temp: 97.8 °F (36.6 °C) Temp  Av.1 °F (36.7 °C)  Min: 97.8 °F (36.6 °C)  Max: 98.6 °F (37 °C)  BP Range: Systolic (55XDN), IIJ:270 , Min:114 , MANDIE:458     Diastolic (60GFE), NGP:22, Min:53, Max:102    Pulse Range: Pulse  Av.8  Min: 59  Max: 99  Respiration Range: Resp  Av.5  Min: 15  Max: 28  Current Pulse Ox: SpO2: 93 %  24HR Pulse Ox Range: SpO2  Av.6 %  Min: 90 %  Max: 98 %  Patient Vitals for the past 12 hrs:   BP Temp Temp src Pulse Resp SpO2   22 0700 131/67 -- -- 75 20 93 %   22 0645 -- -- -- 66 22 91 %   22 0630 -- -- -- 64 20 91 %   22 0615 -- -- -- 64 20 91 %   22 0600 126/66 -- -- 65 20 90 %   22 0530 -- -- -- 64 18 90 %   22 0500 130/68 -- -- 68 21 92 %   22 0445 -- -- -- 69 22 93 %   22 0430 -- -- -- 67 21 91 %   22 0415 -- -- -- 67 22 93 %   22 0400 121/62 97.8 °F (36.6 °C) Oral 68 18 94 %   22 0345 -- -- -- 68 22 92 %   22 0330 -- -- -- 66 20 92 %   22 0315 (!) 128/59 -- -- 69 21 94 %   22 0300 130/61 -- -- 68 20 93 %   22 0245 124/60 -- -- 67 20 93 %   22 0230 122/60 -- -- 66 20 93 %   22 0215 124/73 -- -- 66 20 93 %   22 0200 136/65 -- -- 68 19 93 %   08/09/22 0145 115/65 -- -- 65 21 93 %   22 0130 124/64 -- -- 67 20 94 %   22 0115 124/62 -- -- 65 19 93 %   22 0100 122/62 -- -- 66 17 92 %   22 0045 125/62 -- -- 64 19 93 %   22 0030 119/61 -- -- 63 17 92 %   22 0015 124/69 -- -- 65 19 92 %   22 0000 (!) 116/59 98.6 °F (37 °C) Oral 64 19 --   22 2315 114/63 -- -- 69 15 93 %   22 2300 121/61 -- -- 68 21 95 %   22 2245 (!) 114/53 -- -- 64 21 94 %   22 2230 118/66 -- -- 61 18 92 %   22 2215 120/66 -- -- 61 19 93 %   22 2200 119/70 -- -- 63 19 94 %   22 2145 115/67 -- -- 62 18 93 %   22 2130 119/66 -- -- 60 18 93 %   22 2115 128/68 -- -- 65 20 95 %         RECENT LABS:   Lab Results   Component Value Date    WBC 5.3 2022    HGB 15.3 2022    HCT 41.7 2022    PLT 82 (L) 2022     2022    K 3.8 2022     2022    CREATININE 0.84 2022    BUN 15 2022    CO2 23 2022    INR 1.1 2022     24 HOUR INTAKE/OUTPUT:  Intake/Output Summary (Last 24 hours) at 2022 0911  Last data filed at 2022 5188  Gross per 24 hour   Intake 2516.5 ml   Output 720 ml   Net 1796.5 ml       Labs and Images reviewed with:  [] Dr. Tyson Cantrell. Yazan    [] Dr. Shantell Ogden  [] Dr. Mitchell Ellis  [] There are no new interval images to review. PHYSICAL EXAM       CONSTITUTIONAL:  Well developed, well nourished, alert and oriented x 3, in no acute distress. GCS 15. Nontoxic. No dysarthria. No aphasia. HEAD:  normocephalic, atraumatic    EYES:  PERRLA, EOMI. Visual Acuity and Peripheral vision in tact b/l   ENT:  moist mucous membranes   NECK:  supple, symmetric   LUNGS:  Equal air entry bilaterally   CARDIOVASCULAR:  normal s1 / s2, RRR, distal pulses intact   ABDOMEN:  Soft, no rigidity   NEUROLOGIC:  Mental Status:  A & O x3,awake     Tone:  normal  Sensory:    Touch:    Right Upper Extremity:  normal  Left Upper Extremity:  normal  Right Lower Extremity:  normal  Left Lower Extremity:  normal  Plantar Response:  Right:  downgoing  Left:  downgoing    Clonus:  absent         DRAINS:  [x] There are no drains for Neuro Critical Care to monitor at this time.      ASSESSMENT AND PLAN:       NEUROLOGIC:  - Imaging MRI today at 24 hour caroline, if normal will plan to start ASA 81mg and lovenox  - AEDs none  - Goal SBP < 120 for now, nicardipine gtt to maintain goal    CARDIOVASCULAR:  BP Range: Systolic (27RAO), KAV:980 , Min:114 , LXI:071     Diastolic (01NIY), QZB:29, Min:53, Max:102    Pulse Range: Pulse  Av.8  Min: 59  Max: 99  - Goal SBP < 120 for now  Will keep A line until off nicardipine  Start home losartan  lipitor    PULMONARY:  Respiration Range: Resp  Av.5  Min: 15 Max: 28  Current Pulse Ox: SpO2: 93 %  24HR Pulse Ox Range: SpO2  Av.6 %  Min: 90 %  Max: 98 %      RENAL/FLUID/ELECTROLYTE:  - BUN 15/ Creatinine 0.84  - I/O: In: 2516.5 [P.O.:340; I.V.:1826.7; Blood:200]  Out: 720 [Urine:720]  - IVF: none    GI/NUTRITION:  NUTRITION:  ADULT DIET; Regular  - Bowel regimen: none  - GI prophylaxis: none    ID:  Tmax: Temp (24hrs), Av.1 °F (36.7 °C), Min:97.8 °F (36.6 °C), Max:98.6 °F (37 °C)    Temperature Range: Temp: 97.8 °F (36.6 °C) Temp  Av.1 °F (36.7 °C)  Min: 97.8 °F (36.6 °C)  Max: 98.6 °F (37 °C)  - WBC   Lab Results   Component Value Date    WBC 5.3 2022     - Antimicrobials:  not indicated     HEME:   Recent Labs     22  1454   HGB 15.3     82,89, stable, trending up, and trending down  - Platelets 89    ENDOCRINE:  - Continue to monitor blood glucose, goal <180  - glucose controlled - most recent BGL is   Recent Labs     22  1454   GLUCOSE 129*       OTHER:  - PT/OT/ST   - Code Status: Full Code    PROPHYLAXIS:  Stress ulcer: none    DVT PROPHYLAXIS:  - SCD sleeves - Thigh High   - if MRI stable will start lovenox    DISPOSITION:  [x] To remain ICU: yes  [] OK for out of ICU from Neuro Critical Care standpoint    We will continue to follow along. For any changes in exam or patient status please contact Neuro Critical Care.       Gurmeet Atkins MD  Neuro Critical Care  Pager [phone]  2022     9:11 AM

## 2022-08-09 NOTE — PROGRESS NOTES
Closure Time:    Rt groin sheath removal    Assisted Dr. Deisi Oquendo with Rt groin arterial sheath removal.  Pt draped in sterile fashion. Site cleaned with Chloro prep by Dr Jose R Blair deployed in sterile fashion. All instrumentation removed. Manual pressure held by Dr. Deisi Oquendo.    No obvious bleeding, hematoma noted, small amount of bruising noted to puncture site area. SafeGuard applied externally to puncture site.

## 2022-08-09 NOTE — FLOWSHEET NOTE
[address] [facility] [address]  PROGRESS NOTE    Shift date: 8/9/2022   Shift day: Tuesday   Shift # 1    Room # 0128/0128-01   Name: Bettina Zamora                [facility]:  [facility]: [place] in [place], [place]    Referral: Routine Visit    Admit Date & Time: 8/8/2022  2:23 PM    Assessment:  Bettina Zamora is a 77 y.o. male. Upon entering the room patient appeared to be sleeping, wife and son present. Wife spoke with hope stating that patient was doing \"much better\". Wife and son engaged in conversation. Patient's  will be visiting this afternoon. Intervention:   provided a supportive presence. Writer offered space for family  to express feelings, needs, and concerns and provided a ministry presence. Outcome:  Family expressed appreciation for support. Patient thanked . Plan:  Chaplains will remain available to offer spiritual and emotional support as needed. Electronically signed by Alison Baker on 8/9/2022 at 1:58 PM.  [facility]  [phone]        08/08/22 1545 08/09/22 1354   Encounter Summary   Service Provided For:  --  Patient and family together   Last Encounter   --  08/09/22   Complexity of Encounter  --  Low   Begin Time 1545 1250   End Time  1600 1305   Total Time Calculated 15 min 15 min   Encounter    Type  --  Family Care   Assessment/Intervention/Outcome   Assessment  --  Calm;Coping; Hopeful   Intervention  --  Active listening;Explored/Affirmed feelings, thoughts, concerns   Outcome  --  Engaged in conversation;Expressed feelings, needs, and concerns;Expressed Gratitude;Receptive

## 2022-08-10 LAB
ESTIMATED AVERAGE GLUCOSE: 88 MG/DL
HBA1C MFR BLD: 4.7 % (ref 4–6)

## 2022-08-10 PROCEDURE — 2500000003 HC RX 250 WO HCPCS: Performed by: NURSE PRACTITIONER

## 2022-08-10 PROCEDURE — 2500000003 HC RX 250 WO HCPCS: Performed by: STUDENT IN AN ORGANIZED HEALTH CARE EDUCATION/TRAINING PROGRAM

## 2022-08-10 PROCEDURE — 99233 SBSQ HOSP IP/OBS HIGH 50: CPT | Performed by: PSYCHIATRY & NEUROLOGY

## 2022-08-10 PROCEDURE — 97530 THERAPEUTIC ACTIVITIES: CPT

## 2022-08-10 PROCEDURE — 97535 SELF CARE MNGMENT TRAINING: CPT

## 2022-08-10 PROCEDURE — 97162 PT EVAL MOD COMPLEX 30 MIN: CPT

## 2022-08-10 PROCEDURE — 6360000002 HC RX W HCPCS: Performed by: PSYCHIATRY & NEUROLOGY

## 2022-08-10 PROCEDURE — 6370000000 HC RX 637 (ALT 250 FOR IP): Performed by: PSYCHIATRY & NEUROLOGY

## 2022-08-10 PROCEDURE — 2580000003 HC RX 258: Performed by: PSYCHIATRY & NEUROLOGY

## 2022-08-10 PROCEDURE — 2580000003 HC RX 258: Performed by: STUDENT IN AN ORGANIZED HEALTH CARE EDUCATION/TRAINING PROGRAM

## 2022-08-10 PROCEDURE — 2580000003 HC RX 258: Performed by: NURSE PRACTITIONER

## 2022-08-10 PROCEDURE — 2500000003 HC RX 250 WO HCPCS: Performed by: PSYCHIATRY & NEUROLOGY

## 2022-08-10 PROCEDURE — 6360000002 HC RX W HCPCS: Performed by: STUDENT IN AN ORGANIZED HEALTH CARE EDUCATION/TRAINING PROGRAM

## 2022-08-10 PROCEDURE — 2000000000 HC ICU R&B

## 2022-08-10 PROCEDURE — 97165 OT EVAL LOW COMPLEX 30 MIN: CPT

## 2022-08-10 RX ORDER — ENOXAPARIN SODIUM 100 MG/ML
40 INJECTION SUBCUTANEOUS DAILY
Status: DISCONTINUED | OUTPATIENT
Start: 2022-08-10 | End: 2022-08-11 | Stop reason: HOSPADM

## 2022-08-10 RX ORDER — AMLODIPINE BESYLATE 10 MG/1
10 TABLET ORAL DAILY
Status: DISCONTINUED | OUTPATIENT
Start: 2022-08-10 | End: 2022-08-11 | Stop reason: HOSPADM

## 2022-08-10 RX ORDER — NADOLOL 20 MG/1
40 TABLET ORAL DAILY
Status: DISCONTINUED | OUTPATIENT
Start: 2022-08-10 | End: 2022-08-11 | Stop reason: HOSPADM

## 2022-08-10 RX ORDER — ASPIRIN 81 MG/1
81 TABLET, CHEWABLE ORAL DAILY
Status: DISCONTINUED | OUTPATIENT
Start: 2022-08-10 | End: 2022-08-11 | Stop reason: HOSPADM

## 2022-08-10 RX ADMIN — ASPIRIN 81 MG: 81 TABLET, CHEWABLE ORAL at 09:13

## 2022-08-10 RX ADMIN — LOSARTAN POTASSIUM 50 MG: 50 TABLET, FILM COATED ORAL at 09:13

## 2022-08-10 RX ADMIN — THIAMINE HYDROCHLORIDE 500 MG: 100 INJECTION, SOLUTION INTRAMUSCULAR; INTRAVENOUS at 19:39

## 2022-08-10 RX ADMIN — SODIUM CHLORIDE 11 MG/HR: 9 INJECTION, SOLUTION INTRAVENOUS at 02:00

## 2022-08-10 RX ADMIN — SODIUM CHLORIDE 11 MG/HR: 9 INJECTION, SOLUTION INTRAVENOUS at 04:10

## 2022-08-10 RX ADMIN — FOLIC ACID 1 MG: 5 INJECTION, SOLUTION INTRAMUSCULAR; INTRAVENOUS; SUBCUTANEOUS at 09:18

## 2022-08-10 RX ADMIN — SODIUM CHLORIDE 7.5 MG/HR: 9 INJECTION, SOLUTION INTRAVENOUS at 10:25

## 2022-08-10 RX ADMIN — Medication 10 MG: at 20:38

## 2022-08-10 RX ADMIN — AMLODIPINE BESYLATE 10 MG: 10 TABLET ORAL at 09:13

## 2022-08-10 RX ADMIN — SODIUM CHLORIDE 11 MG/HR: 9 INJECTION, SOLUTION INTRAVENOUS at 00:00

## 2022-08-10 RX ADMIN — ATORVASTATIN CALCIUM 80 MG: 80 TABLET, FILM COATED ORAL at 20:35

## 2022-08-10 RX ADMIN — SODIUM CHLORIDE 14 MG/HR: 9 INJECTION, SOLUTION INTRAVENOUS at 07:55

## 2022-08-10 RX ADMIN — ENOXAPARIN SODIUM 40 MG: 100 INJECTION SUBCUTANEOUS at 09:13

## 2022-08-10 RX ADMIN — NADOLOL 40 MG: 20 TABLET ORAL at 10:44

## 2022-08-10 RX ADMIN — SODIUM CHLORIDE 15 MG/HR: 9 INJECTION, SOLUTION INTRAVENOUS at 05:53

## 2022-08-10 ASSESSMENT — PAIN SCALES - GENERAL
PAINLEVEL_OUTOF10: 0
PAINLEVEL_OUTOF10: 0

## 2022-08-10 NOTE — PROGRESS NOTES
Physical Therapy  Facility/Department: [address]  Physical Therapy Initial Assessment    Name: Arnulfo rBandt  : 1955  MRN: [id number]  Date of Service: 8/10/2022  Chief Complaint   Patient presents with    Cerebrovascular Accident     Discharge Recommendations: No further therapy required at discharge. PT Equipment Recommendations  Equipment Needed: No      Patient Diagnosis(es): The primary encounter diagnosis was Cerebrovascular accident (CVA), unspecified mechanism ([facility] A diagnosis of Stroke aborted by administration of thrombolytic agent [facility]) was also pertinent to this visit. Past Medical History:  has a past medical history of Cirrhosis of liver ([facility] [address] Hepatitis C, Hyperlipidemia, and Hypertension. Past Surgical History:  has no past surgical history on file. Assessment   Body Structures, Functions, Activity Limitations Requiring Skilled Therapeutic Intervention: Decreased functional mobility ; Decreased endurance;Decreased balance  Assessment: The pt ambulate 300ft and completed 10 steps with CGA, limited by mild gait and endurance deficits. Recommend continued acute PT to progress toward prior level of independence. The pt should be safe to return to prior living arrangement with family support.   Therapy Prognosis: Good  Decision Making: Medium Complexity  Barriers to Learning: decreased awareness of deficits  Requires PT Follow-Up: Yes  Activity Tolerance  Activity Tolerance: Patient limited by endurance  Activity Tolerance Comments: pt SOB with minimal exertion, SPO2 89%-94% throughout     Plan   Plan  Plan: 2-3 times per week  Current Treatment Recommendations: Strengthening, ROM, Balance training, Functional mobility training, Transfer training, Endurance training, Therapeutic activities, Gait training, Stair training, Patient/Caregiver education & training, Safety education & training, Home exercise program, Neuromuscular re-education  Safety Devices  Type of Devices: Nurse notified, Gait belt, Call light within reach, Left in bed (sitting EOB with OT upon exit)  Restraints  Restraints Initially in Place: No     Restrictions  Restrictions/Precautions  Restrictions/Precautions: Fall Risk  Required Braces or Orthoses?: No  Position Activity Restriction  Other position/activity restrictions: SBP <140, L basal ganglia infarct s/p TNK and thrombectomy 8/8     Subjective   General  Patient assessed for rehabilitation services?: Yes  Response To Previous Treatment: Not applicable  Family / Caregiver Present: Yes (pt's wife)  Follows Commands: Within Functional Limits  Subjective  Subjective: RN and pt agreeable to PT. Pt supine in bed upon arrival, pleasant and cooperative throughout. Social/Functional History  Social/Functional History  Lives With: Spouse  Type of Home: House  Home Layout: Multi-level, Bed/Bath upstairs (tri-level home, bedroom on top floor, bathroom on all 3 levels)  Home Access: Stairs to enter with rails  Entrance Stairs - Number of Steps: 2  Entrance Stairs - Rails: Right  Bathroom Shower/Tub: Tub/Shower unit  Bathroom Toilet: Handicap height  Bathroom Equipment: Grab bars in shower  Home Equipment: Walker, rolling, Rollator, Cane (ambulates without AD at baseline)  ADL Assistance: Independent  Homemaking Assistance: Independent  Homemaking Responsibilities: Yes  Ambulation Assistance: Independent  Transfer Assistance: Independent  Active : Yes  Mode of Transportation: Truck, Motorcycle  Occupation: Full time employment  Type of Occupation: custom   Leisure & Hobbies: Enjoys riding motorcycle, cards  Additional Comments: Pt reports wife is retired and able to provide 24hr assistance; son also lives nearby and is able to assist PRN.   Vision/Hearing  Vision  Vision: Within Functional Limits  Hearing  Hearing: Within functional limits    Cognition   Orientation  Overall Orientation Status: Within Functional Limits  Cognition  Overall Cognitive Status: Exceptions  Arousal/Alertness: Appropriate responses to stimuli  Following Commands: Follows multistep commands with repitition  Safety Judgement: Decreased awareness of need for assistance  Insights: Decreased awareness of deficits  Initiation: Does not require cues  Sequencing: Requires cues for some     Objective     Gross Assessment  Tone: Normal  Sensation: Impaired (pt reports exacerbation of B foot tingling)     Joint Mobility  Spine: WFL  ROM RLE: WFL  ROM LLE: WFL  ROM RUE: WFL  ROM LUE: WFL  Strength RLE  Strength RLE: WFL  Strength LLE  Strength LLE: WFL  Comment: mild strength deficit in LLE noted functionally during ambulation compared to RLE, 5/5 with MMT  Strength RUE  Strength RUE: WFL  Comment: formally assessed by OT  Strength LUE  Strength LUE: WFL  Comment: formally assessed by OT           Bed mobility  Supine to Sit: Stand by assistance  Sit to Supine:  (sitting EOB with OT upon exit)  Scooting: Contact guard assistance  Bed Mobility Comments: HOB flat, increased time and effort required to complete supine to sit.  SPO2 decreased to 89%, required ~1 minute to recover >90%  Transfers  Sit to Stand: Stand by assistance  Stand to sit: Stand by assistance  Comment: pt declined CGA, \"I can do it\"  Ambulation  Surface: level tile  Device: No Device  Assistance: Stand by assistance  Quality of Gait: mild decrease in R stance noted causing increased lateral sway  Gait Deviations: Slow Jocelyn;Decreased step length;Decreased step height  Distance: 300ft  Comments: SPO2 94% following ambulation  Stairs/Curb  Stairs?: Yes  Stairs  # Steps : 10  Stairs Height: 6\"  Rails: Right ascending  Device: No Device  Assistance: Contact guard assistance  Comment: reciprocal ascending, non-reciprocal descending     Balance  Posture: Good  Sitting - Static: Good  Sitting - Dynamic: Good;-  Standing - Static: Good;-  Standing - Dynamic: Fair;+  Comments: standing balance assessed without AD         AM-PAC Score  AM-[facility] Inpatient Mobility Raw Score : 19 (08/10/22 1407)  AM-PAC Inpatient T-Scale Score : 45.44 (08/10/22 1407)  Mobility Inpatient CMS 0-100% Score: 41.77 (08/10/22 1407)  Mobility Inpatient CMS G-Code Modifier : CK (08/10/22 1407)        Goals  Short Term Goals  Time Frame for Short term goals: 6 visits  Short term goal 1: Perform bed mobility and functional transfers independently  Short term goal 2: Ambulate 600ft independently  Short term goal 3: Ascend/descend 20 steps with HR and supervision  Short term goal 4: Demo Good dynamic standing balance to decrease risk of falls       Education  Patient Education  Education Given To: Patient; Family  Education Provided: Role of Therapy;Plan of Care;Precautions; Family Education  Education Method: Verbal  Barriers to Learning:  (decreased awareness of deficits)  Education Outcome: Verbalized understanding      Therapy Time   Individual Concurrent Group Co-treatment   Time In 1337         Time Out 1356         Minutes 19         Timed Code Treatment Minutes: 8 Minutes       Zen Hall PT

## 2022-08-10 NOTE — CARE COORDINATION
08/10/22 1523   Service Assessment   Patient Orientation Alert and Oriented   Cognition Alert   History Provided By Patient   Primary Caregiver Self   Support Systems Spouse/Significant Other   Patient's Healthcare Decision Maker is: Legal Next of Kin   PCP Verified by CM Yes   Last Visit to PCP Within last year   Prior Functional Level Independent in ADLs/IADLs   Current Functional Level Independent in ADLs/IADLs   Can patient return to prior living arrangement Yes   Ability to make needs known: Good   Family able to assist with home care needs: Yes   Would you like for me to discuss the discharge plan with any other family members/significant others, and if so, who? No   Financial Resources Medicare   Social/Functional History   Lives With Spouse   Type of [address] Multi-level;Bed/Bath upstairs   Home Access Stairs to enter with rails   Entrance Stairs - Number of Steps 2   Entrance Stairs - Rails Right   Bathroom Shower/Tub Tub/Shower unit   Bathroom Toilet Handicap height   Bathroom Equipment Grab bars in [facility], rolling;Cane;Rollator   Receives Help From [address] Responsibilities Yes   Ambulation Assistance Independent   Transfer Assistance Independent   Active  Yes   Mode of Transportation Truck; Motorcycle   Occupation Full time employment   Discharge [address],  Spouse/Significant Other   Current Services Prior To Admission [place] [address] Medications No   Type of [address] None   Patient expects to be discharged to: [facility] [address] Discharge   Mode of Transport at Discharge [facility]

## 2022-08-10 NOTE — PROGRESS NOTES
Occupational Therapy  Facility/Department: [address]  Occupational Therapy Initial Assessment    Name: Garth Mckeon  : 1955  MRN: [id number]  Date of Service: 8/10/2022    Discharge Recommendations: No therapy recommended at discharge. OT Equipment Recommendations  Equipment Needed: No       Patient Diagnosis(es): The primary encounter diagnosis was Cerebrovascular accident (CVA), unspecified mechanism ([facility] A diagnosis of Stroke aborted by administration of thrombolytic agent [facility]) was also pertinent to this visit. Past Medical History:  has a past medical history of Cirrhosis of liver ([facility] Hepatitis C, Hyperlipidemia, and Hypertension. Past Surgical History:  has no past surgical history on file. Assessment   Performance deficits / Impairments: Decreased functional mobility ; Decreased endurance;Decreased safe awareness  Assessment: Pt eval'd and d/c this date d/t independence w/ ADLs. Pt defer to PT to continue improving endurance d/t increased SOB throughout session. Prognosis: Good  Decision Making: Low Complexity  No Skilled OT: Independent with functional mobility; Independent with ADL's  REQUIRES OT FOLLOW-UP: No  Activity Tolerance  Activity Tolerance: Patient Tolerated treatment well  Activity Tolerance Comments: Pt limited by decreased endurance        Plan   Plan  Current Treatment Recommendations: Endurance training  Plan Comment: Pt eval'd and d/c this date d/t independence.      Restrictions  Restrictions/Precautions  Restrictions/Precautions: Fall Risk, General Precautions  Required Braces or Orthoses?: No  Position Activity Restriction  Other position/activity restrictions: SBP goal <140, L basal ganglia infarct s/p TNK and thrombectomy     Subjective   General  Patient assessed for rehabilitation services?: Yes  Family / Caregiver Present: Yes (wife)  Diagnosis: R sided hemiparesis, L basal ganglia infarct d/p TNK and thrombectomy   Subjective  Subjective: pt reported no pain throughout entire session. General Comment  Comments: RN ok'd rochelle this date. Social/Functional History  Social/Functional History  Lives With: Spouse  Type of Home: House  Home Layout: Multi-level, Bed/Bath upstairs (tri-level home, bedroom on top floor, bathroom on all 3 levels)  Home Access: Stairs to enter with rails  Entrance Stairs - Number of Steps: 2  Entrance Stairs - Rails: Right  Bathroom Shower/Tub: Tub/Shower unit  Bathroom Toilet: Handicap height  Bathroom Equipment: Grab bars in shower  Home Equipment: Walker, rolling, Rollator, Cane (ambulates without AD at baseline)  ADL Assistance: Independent  Homemaking Assistance: Independent  Homemaking Responsibilities: Yes  Ambulation Assistance: Independent  Transfer Assistance: Independent  Active : Yes  Mode of Transportation: Truck, Motorcycle  Occupation: Full time employment  Type of Occupation: custom   Leisure & Hobbies: Enjoys riding motorcycle, cards  Additional Comments: Pt reports wife is retired and able to provide 24hr assistance; son also lives nearby and is able to assist PRN. Objective   SpO2: 91 %  O2 Device: None (Room air)             Safety Devices  Type of Devices: Bed alarm in place;Call light within reach;Gait belt;Patient at risk for falls; Left in bed;Nurse notified  Restraints  Restraints Initially in Place: No  Bed Mobility Training  Bed Mobility Training: Yes  Supine to Sit: Contact-guard assistance  Sit to Supine: Supervision  Scooting: Supervision  Balance  Sitting: Intact (Pt sat unsupported in bed independently ~6 mins)  Standing: Intact (Pt stood without use of LRD independently ~20 mins.  Noted increase in SOB.)  Transfer Training  Transfer Training: Yes  Sit to Stand: Independent  Stand to Sit: Independent  Gait  Overall Level of Assistance: Independent (Pt demo'd func mob around room/hallway without use of LRD independently, noted increase in SOB.)     AROM: Within functional Provided Comments: Pt educated on figure-4 method. Education Method: Demonstration;Verbal  Barriers to Learning: None  Education Outcome: Verbalized understanding  LUE AROM (degrees)  LUE AROM : WFL (L elbow and shoulder AROM WFL)  Left Hand AROM (degrees)  Left Hand AROM: WFL  RUE AROM (degrees)  RUE AROM : WFL (R elbow and shoulder AROM WFL)  Right Hand AROM (degrees)  Right Hand AROM: [facility]          AM-PAC Score        AM-[facility] Inpatient Daily Activity Raw Score: 24 (08/10/22 1436)  AM-PAC Inpatient ADL T-Scale Score : 57.54 (08/10/22 1436)  ADL Inpatient CMS 0-100% Score: 0 (08/10/22 1436)  ADL Inpatient CMS G-Code Modifier : [facility] (08/10/22 1436)         Goals  Short Term Goals  Time Frame for Short term goals: Pt eval'd and d/c this date d/t independence.        Therapy Time   Individual Concurrent Group Co-treatment   Time In 7883         Time Out 0214         Minutes 37         Timed Code Treatment Minutes: [address] Aleksandr Tafoya S/OT

## 2022-08-10 NOTE — ACP (ADVANCE CARE PLANNING)
Advance Care Planning     Advance Care Planning Activator (Inpatient)  Conversation Note      Date of ACP Conversation: 8/10/2022     Conversation Conducted with: Patient with Decision Making Capacity    ACP Activator: Yulissa Singh RN        Health Care Decision Maker:     Current Designated Health Care Decision Maker:     Click here to complete Healthcare Decision Makers including section of the Healthcare Decision Maker Relationship (i    Care Preferences    Ventilation: \"If you were in your present state of health and suddenly became very ill and were unable to breathe on your own, what would your preference be about the use of a ventilator (breathing machine) if it were available to you? \"      Would the patient desire the use of ventilator (breathing machine)?: yes    \"If your health worsens and it becomes clear that your chance of recovery is unlikely, what would your preference be about the use of a ventilator (breathing machine) if it were available to you? \"     Would the patient desire the use of ventilator (breathing machine)?: Yes      Resuscitation  \"CPR works best to restart the heart when there is a sudden event, like a heart attack, in someone who is otherwise healthy. Unfortunately, CPR does not typically restart the heart for people who have serious health conditions or who are very sick. \"    \"In the event your heart stopped as a result of an underlying serious health condition, would you want attempts to be made to restart your heart (answer \"yes\" for attempt to resuscitate) or would you prefer a natural death (answer \"no\" for do not attempt to resuscitate)? \" yes       [] Yes   [] No   Educated Patient / Yoandy Parker regarding differences between Advance Directives and portable DNR orders.     Length of ACP Conversation in minutes:      Conversation Outcomes:  [x] ACP discussion completed  [] Existing advance directive reviewed with patient; no changes to patient's previously recorded wishes  [] New Advance Directive completed  [] Portable Do Not Rescitate prepared for Provider review and signature  [] POLST/POST/MOLST/MOST prepared for Provider review and signature      Follow-up plan:    [] Schedule follow-up conversation to continue planning  [] Referred individual to Provider for additional questions/concerns   [] Advised patient/agent/surrogate to review completed ACP document and update if needed with changes in condition, patient preferences or care setting    [] This note routed to one or more involved healthcare providers

## 2022-08-10 NOTE — PROGRESS NOTES
Daily Progress Note  Neuro Critical Care    Patient Name: Sunshine Mendoza  Patient : 1955  Room/Bed: 0128/0128-01  Code Status: Full Code  Allergies: No Known Allergies    CHIEF COMPLAINT:     Slurred speech     INTERVAL HISTORY    Initial Presentation (Admitted ): The patient is a 77 y.o. male presented with PMH of hypertension, acute onset of right arm and leg weakness along with impaired speech. Patient was last known well at approximately 1255. According to documents, patient went to the restroom at his workplace and did not come out. Workers found the patient on the commode not moving the right side and not able to communicate. Mobile stroke unit was called and patient subsequently received TKA at approximately 2 PM.  NIH was roughly 17. CTA of the head and neck showed a left 1 occlusion which was initially noted on CT head with a left hyperdense sign. Patient was taken for emergent thrombectomy. TICI 3, first-pass. Postprocedure, patient does display mild aphasia. He does also display subtle right sided weakness. No history of stroke. He is a daily smoker for over 40 years. Unclear if patient is on any antiplatelet therapy. Also does not recall what transpired and appears mildly confused. Patient is fully functional and able to perform his daily activities without any impairment. Hospital Course:   : emergent thrombectomy left MCA M1 segment based on CTA with left occlusion. Received TNk.  : MRI acute infarct L basal ganglia, and right frontal lobe/bilat parietal lobes/left temporal lobes suggesting embolic process. Sheath removed by endovascular team.    Last 24h:  No acute events overnight.       CURRENT MEDICATIONS:  SCHEDULED MEDICATIONS:   atorvastatin  80 mg Oral Nightly    losartan  50 mg Oral Daily    folic acid IVPB  1 mg IntraVENous Daily    thiamine (VITAMIN B1) IVPB  500 mg IntraVENous Q24H     CONTINUOUS INFUSIONS:   niCARdipine 15 mg/hr (08/10/22 Abbe)     PRN MEDICATIONS:   sodium chloride flush, acetaminophen, ondansetron **OR** ondansetron, labetalol, LORazepam    VITALS:  Temperature Range: Temp: 97.8 °F (36.6 °C) Temp  Av.1 °F (36.7 °C)  Min: 97.3 °F (36.3 °C)  Max: 98.6 °F (37 °C)  BP Range: Systolic (42GRU), EER:880 , Min:102 , DHS:749     Diastolic (17TAD), QJL:19, Min:53, Max:106    Pulse Range: Pulse  Av.6  Min: 60  Max: 84  Respiration Range: Resp  Av.7  Min: 14  Max: 28  Current Pulse Ox: SpO2: 93 %  24HR Pulse Ox Range: SpO2  Av.5 %  Min: 90 %  Max: 95 %  Patient Vitals for the past 12 hrs:   BP Temp Temp src Pulse Resp SpO2   08/10/22 0445 -- -- -- 67 19 93 %   08/10/22 0430 -- -- -- 60 15 94 %   08/10/22 0415 -- -- -- 63 16 94 %   08/10/22 0400 (!) 131/57 97.8 °F (36.6 °C) Oral 66 18 94 %   08/10/22 0345 -- -- -- 63 18 93 %   08/10/22 0330 -- -- -- 73 28 90 %   08/10/22 0315 -- -- -- 69 21 93 %   08/10/22 0300 119/76 -- -- 68 18 94 %   08/10/22 0245 -- -- -- 65 17 92 %   08/10/22 0230 -- -- -- 69 20 93 %   08/10/22 0215 -- -- -- 64 17 93 %   08/10/22 0200 (!) 121/57 97.8 °F (36.6 °C) -- 65 19 94 %   08/10/22 0145 -- -- -- 69 18 93 %   08/10/22 0130 -- -- -- 67 19 93 %   08/10/22 0115 -- -- -- 66 19 92 %   08/10/22 0100 (!) 125/53 -- -- 71 22 90 %   08/10/22 0045 -- -- -- 68 18 94 %   08/10/22 0030 -- -- -- 67 19 94 %   08/10/22 0015 -- -- -- 66 19 93 %   08/10/22 0000 (!) 104/54 98.2 °F (36.8 °C) Oral 65 18 93 %   22 2345 -- -- -- 66 19 94 %   22 2330 -- -- -- 67 21 92 %   22 2315 -- -- -- 67 21 93 %   22 2300 105/74 -- -- 66 21 93 %   22 2245 -- -- -- 61 19 95 %   22 2230 -- -- -- 62 19 94 %   22 2215 -- -- -- 60 20 94 %   22 2200 (!) 104/56 -- -- 67 18 95 %   22 2145 -- -- -- 63 19 95 %   22 2130 -- -- -- 65 20 94 %   22 2115 -- -- -- 69 18 94 %   22 2100 130/62 -- -- 64 20 93 %   22 2045 -- -- -- 65 21 94 %   22 2030 -- -- -- 67 18 93 % 08/09/22 2015 -- -- -- 68 22 95 %   08/09/22 2000 119/63 98 °F (36.7 °C) Oral 63 21 95 %   08/09/22 1945 -- -- -- 70 19 93 %   08/09/22 1930 -- -- -- 61 20 94 %   08/09/22 1915 -- -- -- 61 19 94 %         RECENT LABS:   Lab Results   Component Value Date    WBC 7.3 08/09/2022    HGB 14.2 08/09/2022    HCT 38.6 (L) 08/09/2022    PLT 89 (L) 08/09/2022    CHOL 149 08/09/2022    TRIG 66 08/09/2022    HDL 53 08/09/2022     08/09/2022    K 3.8 08/09/2022     08/09/2022    CREATININE 0.61 (L) 08/09/2022    BUN 13 08/09/2022    CO2 20 08/09/2022    INR 1.1 08/08/2022     24 HOUR INTAKE/OUTPUT:  Intake/Output Summary (Last 24 hours) at 8/10/2022 0711  Last data filed at 8/10/2022 0100  Gross per 24 hour   Intake 1188.04 ml   Output 1115 ml   Net 73.04 ml       Labs and Images reviewed with:  [] Dr. Evangelina Arango. Yazan    [] Dr. Joanna Rivera  [] Dr. Celestina Rachel  [] There are no new interval images to review. PHYSICAL EXAM       CONSTITUTIONAL:  Well developed, well nourished, alert and oriented x 3, in no acute distress. GCS 15. Nontoxic. No dysarthria. No aphasia. HEAD:  normocephalic, atraumatic    EYES:  PERRLA, EOMI. Visual Acuity and Peripheral vision in tact b/l   ENT:  moist mucous membranes   NECK:  supple, symmetric   LUNGS:  Equal air entry bilaterally   CARDIOVASCULAR:  normal s1 / s2, RRR, distal pulses intact   ABDOMEN:  Soft, no rigidity   NEUROLOGIC:  Mental Status:  A & O x3,awake     Tone:  normal  Sensory:    Touch:    Right Upper Extremity:  normal  Left Upper Extremity:  normal  Right Lower Extremity:  normal  Left Lower Extremity:  normal  Plantar Response:  Right:  downgoing  Left:  downgoing    Clonus:  absent         DRAINS:  [x] There are no drains for Neuro Critical Care to monitor at this time.      ASSESSMENT AND PLAN:       NEUROLOGIC:  - Imaging MRI today at 24 hour caroline, if normal will plan to start ASA 81mg and lovenox  - AEDs none  - Goal SBP < 120 for now, nicardipine gtt to maintain goal, will keep for 24 hours only then liberate BP goal, approx 2pm will turn off nicardipine. CARDIOVASCULAR:  BP Range: Systolic (49ERI), :909 , Min:102 , BQJ:531     Diastolic (70YEC), NXL:07, Min:53, Max:106    Pulse Range: Pulse  Av.6  Min: 60  Max: 84  - Goal SBP < 120 for now  Will keep A line until off nicardipine  Start home losartan  lipitor    PULMONARY:  Respiration Range: Resp  Av.7  Min: 14  Max: 28  Current Pulse Ox: SpO2: 93 %  24HR Pulse Ox Range: SpO2  Av.5 %  Min: 90 %  Max: 95 %      RENAL/FLUID/ELECTROLYTE:  - BUN 13/ Creatinine 0.61  - I/O: In: 1188 [I.V.:1138]  Out: 1115 [Urine:1115]  - IVF: none    GI/NUTRITION:  NUTRITION:  ADULT DIET; Regular  - Bowel regimen: none  - GI prophylaxis: none    ID:  Tmax: Temp (24hrs), Av.1 °F (36.7 °C), Min:97.3 °F (36.3 °C), Max:98.6 °F (37 °C)    Temperature Range: Temp: 97.8 °F (36.6 °C) Temp  Av.1 °F (36.7 °C)  Min: 97.3 °F (36.3 °C)  Max: 98.6 °F (37 °C)  - WBC   Lab Results   Component Value Date    WBC 7.3 2022     - Antimicrobials:  not indicated     HEME:   Recent Labs     22  1454 22  0957   HGB 15.3 14.2     82,89, stable, trending up, and trending down  - Platelets 89    ENDOCRINE:  - Continue to monitor blood glucose, goal <180  - glucose controlled - most recent BGL is 121  Recent Labs     22  1454 22  0957   GLUCOSE 129* 121*       OTHER:  - PT/OT/ST   - Code Status: Full Code    PROPHYLAXIS:  Stress ulcer: none    DVT PROPHYLAXIS:  - SCD sleeves - Thigh High   - if MRI stable will start lovenox    DISPOSITION:  [x] To remain ICU: yes  [] OK for out of ICU from Neuro Critical Care standpoint    We will continue to follow along. For any changes in exam or patient status please contact Neuro Critical Care.       Patricia Middleton MD  Neuro Critical Care  Pager [phone]  8/10/2022     7:11 AM

## 2022-08-10 NOTE — PROGRESS NOTES
Endovascular Neurosurgery Progress Note    SUBJECTIVE:   No acute events overnight, patient denied any headache overnight     Review of Systems:  CONSTITUTIONAL:  negative for fevers, chills, fatigue and malaise    EYES:  negative for double vision, blurred vision and photophobia     HEENT:  negative for tinnitus, epistaxis and sore throat    RESPIRATORY:  negative for cough, shortness of breath, wheezing    CARDIOVASCULAR:  negative for chest pain, palpitations, syncope, edema    GASTROINTESTINAL:  negative for nausea, vomiting    GENITOURINARY:  negative for incontinence    MUSCULOSKELETAL:  negative for neck or back pain    NEUROLOGICAL:  Negative for weakness and tingling  negative for headaches and dizziness    PSYCHIATRIC:  negative for anxiety      Review of systems otherwise negative. OBJECTIVE:     Vitals:    08/10/22 1800   BP: (!) 106/91   Pulse: 62   Resp: 28   Temp:    SpO2: (!) 89%        General:  Gen: normal habitus, NAD  HEENT: NCAT, mucosa moist  Cvs: RRR, S1 S2 normal  Resp: symmetric unlabored breathing  Abd: s/nd/nt  Ext: no edema  Skin: no lesions seen, warm and dry    Neuro:  Gen: awake and alert, oriented x3. Lang/speech: some impaired fluency, no aphasia or dysarthria. Follows commands. CN: PERRL, EOMI, VFF, V1-3 intact,Right facial nasolabial fold flattening, hearing intact, shoulder shrug symmetric, tongue midline  Motor: Right UE and LE 4/5, Left UE and LE 5/5   Sense: LT intact in all 4 ext. Coord: FTN and HTS intact b/l  DTR: deferred  Gait: narrow base gait    NIH Stroke Scale:   1a  Level of consciousness: 0 - alert; keenly responsive   1b. LOC questions:  0 - answers both questions correctly   1c. LOC commands: 0 - performs both tasks correctly   2. Best Gaze: 0 - normal   3. Visual: 0 - no visual loss   4. Facial Palsy: 1 - minor paralysis (flattened nasolabial fold, asymmetric on smiling)   5a.  Motor left arm: 0 - no drift, limb holds 90 (or 45) degrees for full 10 seconds   5b. Motor right arm: 1 - drift, limb holds 90 (or 45) degrees but drifts down before full 10 seconds: does not hit bed   6a. Motor left le - no drift; leg holds 30 degree position for full 5 seconds   6b  Motor right le - drift; leg falls by the end of the 5 second period but does not hit bed   7. Limb Ataxia: 0 - absent   8. Sensory: 0 - normal; no sensory loss   9. Best Language:  0 - no aphasia, normal   10. Dysarthria: 1 - mild to moderate, patient slurs at least some words and at worst, can be understood with some difficulty   11. Extinction and Inattention: 0 - no abnormality         Total:   4     MRS: 04      LABS:   Reviewed. Lab Results   Component Value Date    HGB 14.2 2022    WBC 7.3 2022    PLT 89 (L) 2022     2022    BUN 13 2022    CREATININE 0.61 (L) 2022    APTT 22.7 2022    INR 1.1 2022      No results found for: COVID19    RADIOLOGY:   Images were personally reviewed including:   MRI brain:   1. Acute infarction in the left basal ganglia. 2. Additional small foci of acute infarction involving the right frontal   lobe, bilateral parietal lobes, and left temporal lobe, suggesting an embolic   process. 3. Microangiopathic change. IR:   Left MCA proximal M-1 occlusion TICI score 0  The above lesion was treated with 8 fr short sheath, Emboguard BGC, Rebar microcatheter and Embo trap 6.5 mm x 45 mm with mechanical aspiration using Penumbra engine, 1 Pass  Post thrombectomy Left ICA vasospasm was treated with IA nitroglycerine, IA nicardipine gtt, TICI score-03  CTA head and neck:   Occlusion of the M1 segment of the left MCA starting from its origin, with a   few patent M2 and distal branches in the left MCA territory. This finding is   likely related to acute thrombus.        Subtle decreased gray-white differentiation in the left MCA territory,   particularly in the left basal ganglia and along the left insula cortex,

## 2022-08-10 NOTE — CONSULTS
[facility]    Consult                        Today's Date: 8/10/2022  Patient Name: Livia Stone  Date of admission: 8/8/2022  2:23 PM  Patient's age: 77 y.o., 1955  Admission Dx: Cerebrovascular accident (CVA), unspecified mechanism ([facility] [I63.9]  Stroke aborted by administration of thrombolytic agent ([facility] [I63.9]  Ischemic stroke ([facility] [I63.9]    Reason for Consult:  Cardiac evaluation    Requesting Physician: Mickey Alegre MD    CHIEF COMPLAINT:  weakness     History Obtained From:  patient, electronic medical record    HISTORY OF PRESENT ILLNESS:      The patient is a 77 y.o. with history of high blood pressure and no previous cardiac work-up male who is admitted to the hospital for right-sided weakness. Patient states that he was at work in the bathroom and he had fallen on the right side and coworker found him in the bathroom and called EMS. Patient does not recall the event . CTA of the head in the hospital revealed proximal M1 occlusion and patient had emergent mechanical thrombectomy on 8 /8. Cardiology was consulted for ANNIKA l/oop recorder to rule out embolic event  Patient sitting up in chair eating lunch with wife at the bedside, denies chest pain or shortness of breath    Past Medical History:   has a past medical history of Cirrhosis of liver ([facility] Hepatitis C, Hyperlipidemia, and Hypertension. Past Surgical History:   has no past surgical history on file. Home Medications:    Prior to Admission medications    Not on File       Allergies:  Patient has no known allergies. Social History:   reports that he has been smoking cigarettes. He started smoking about 55 years ago. He has a 54.00 pack-year smoking history. He has never used smokeless tobacco. He reports current alcohol use of about 32.0 standard drinks per week. He reports current drug use. Drug: Marijuana Huertas Kugel).      Family History: family history includes Alcohol Abuse in his brother; Heart Disease in his father. No h/o sudden cardiac death. No for premature CAD    REVIEW OF SYSTEMS:    Constitutional: there has been no unanticipated weight loss. There's been No change in energy level, No change in activity level. Eyes: No visual changes or diplopia. No scleral icterus. ENT: No Headaches, hearing loss or vertigo. No mouth sores or sore throat. Cardiovascular: see above  Respiratory: see above  Gastrointestinal: No abdominal pain, appetite loss, blood in stools. Genitourinary: No dysuria, trouble voiding, or hematuria. Musculoskeletal:  No gait disturbance, No weakness or joint complaints. Integumentary: No rash or pruritis. Neurological: No headache or diplopia. No tingling  Psychiatric: No anxiety, or depression. Endocrine: No temperature intolerance. Hematologic/Lymphatic: No abnormal bruising or bleeding, blood clots or swollen lymph nodes. Allergic/Immunologic: No nasal congestion or hives. PHYSICAL EXAM:      /84   Pulse 64   Temp 98 °F (36.7 °C) (Oral)   Resp 24   Ht 6' 1\" (1.854 m)   Wt 250 lb (113.4 kg)   SpO2 91%   BMI 32.98 kg/m²    Constitutional and General Appearance: alert, cooperative, no distress and appears stated age  HEENT: PERRL, no cervical lymphadenopathy. No masses palpable. Normal oral mucosa  Respiratory:  Normal excursion and expansion without use of accessory muscles  Resp Auscultation: Good respiratory effort. No for increased work of breathing. On auscultation: clear to auscultation bilaterally  Cardiovascular:  Heart tones are crisp and normal. regular S1 and S2.  Jugular venous pulsation Normal  The carotid upstroke is normal in amplitude and contour without delay or bruit   Abdomen:   soft  Bowel sounds present  Extremities:   No edema  Neurological:  Alert and oriented.       DATA:    Diagnostics:    EKG:   Narrative & Impression    Normal sinus rhythm  Nonspecific ST and T wave abnormality  Prolonged QT  Abnormal ECG  No previous ECGs available Specimen Collected: 08/08/22 14:54 EDT      . ECHO: not obtained. Stress Test: not obtained. Cardiac Angiography: not obtained. Labs:     CBC:   Recent Labs     08/08/22  1454 08/09/22  0957   WBC 5.3 7.3   HGB 15.3 14.2   HCT 41.7 38.6*   PLT 82* 89*     BMP:   Recent Labs     08/08/22  1454 08/09/22  0957    140   K 3.8 3.8   CO2 23 20   BUN 15 13   CREATININE 0.84 0.61*   LABGLOM >60 >60   GLUCOSE 129* 121*     PT/INR:   Recent Labs     08/08/22  1454   PROTIME 11.6   INR 1.1     APTT:  Recent Labs     08/08/22  1454   APTT 22.7     CARDIAC ENZYMES:  Recent Labs     08/08/22  1454   CKTOTAL 92     FASTING LIPID PANEL:  Lab Results   Component Value Date/Time    HDL 53 08/09/2022 01:30 PM    TRIG 66 08/09/2022 01:30 PM         IMPRESSION:    Patient Active Problem List   Diagnosis    Stroke aborted by administration of thrombolytic agent ([address]    Ischemic stroke ([address]    Cerebrovascular accident (CVA) ([address]       RECOMMENDATIONS:  Plan for ANNIKA /loop recorder in a.m. as patient was eating lunch-risk and benefits were explained and patient is agreeable      Discussed with patient and Nurse    MEGHA Schwartz - NP      Attending Cardiologist Addendum: I have reviewed the history, physical, subjective, objective, assessment, and plan with the CNP and agree with the note. I have made changes to the note above as needed. Thank you for allowing me to participate in the care of this patient, please do not hesitate to call if you have any questions.     Electronically signed by Dhara Devine MD on 8/10/2022 at 3:26 PM      [facility]  [phone]

## 2022-08-11 ENCOUNTER — APPOINTMENT (OUTPATIENT)
Dept: CARDIAC CATH/INVASIVE PROCEDURES | Age: 67
DRG: 024 | End: 2022-08-11
Payer: MEDICARE

## 2022-08-11 VITALS
SYSTOLIC BLOOD PRESSURE: 112 MMHG | TEMPERATURE: 57.2 F | DIASTOLIC BLOOD PRESSURE: 66 MMHG | HEIGHT: 73 IN | BODY MASS INDEX: 33.13 KG/M2 | WEIGHT: 250 LBS | OXYGEN SATURATION: 91 % | RESPIRATION RATE: 18 BRPM | HEART RATE: 60 BPM

## 2022-08-11 LAB
ABSOLUTE EOS #: 0.19 K/UL (ref 0–0.44)
ABSOLUTE IMMATURE GRANULOCYTE: <0.03 K/UL (ref 0–0.3)
ABSOLUTE LYMPH #: 1.99 K/UL (ref 1.1–3.7)
ABSOLUTE MONO #: 0.65 K/UL (ref 0.1–1.2)
ANION GAP SERPL CALCULATED.3IONS-SCNC: 8 MMOL/L (ref 9–17)
BASOPHILS # BLD: 0 % (ref 0–2)
BASOPHILS ABSOLUTE: <0.03 K/UL (ref 0–0.2)
BUN BLDV-MCNC: 13 MG/DL (ref 8–23)
CALCIUM SERPL-MCNC: 8.4 MG/DL (ref 8.6–10.4)
CHLORIDE BLD-SCNC: 108 MMOL/L (ref 98–107)
CO2: 20 MMOL/L (ref 20–31)
CREAT SERPL-MCNC: 0.5 MG/DL (ref 0.7–1.2)
EOSINOPHILS RELATIVE PERCENT: 3 % (ref 1–4)
GFR AFRICAN AMERICAN: >60 ML/MIN
GFR NON-AFRICAN AMERICAN: >60 ML/MIN
GFR SERPL CREATININE-BSD FRML MDRD: ABNORMAL ML/MIN/{1.73_M2}
GLUCOSE BLD-MCNC: 89 MG/DL (ref 70–99)
HCT VFR BLD CALC: 38.9 % (ref 40.7–50.3)
HEMOGLOBIN: 13.6 G/DL (ref 13–17)
IMMATURE GRANULOCYTES: 0 %
LV EF: 55 %
LVEF MODALITY: NORMAL
LYMPHOCYTES # BLD: 36 % (ref 24–43)
MCH RBC QN AUTO: 36.3 PG (ref 25.2–33.5)
MCHC RBC AUTO-ENTMCNC: 35 G/DL (ref 28.4–34.8)
MCV RBC AUTO: 103.7 FL (ref 82.6–102.9)
MONOCYTES # BLD: 12 % (ref 3–12)
NRBC AUTOMATED: 0 PER 100 WBC
PDW BLD-RTO: 13.8 % (ref 11.8–14.4)
PLATELET # BLD: 71 K/UL (ref 138–453)
PMV BLD AUTO: 10.5 FL (ref 8.1–13.5)
POTASSIUM SERPL-SCNC: 4.2 MMOL/L (ref 3.7–5.3)
RBC # BLD: 3.75 M/UL (ref 4.21–5.77)
RBC # BLD: ABNORMAL 10*6/UL
SEG NEUTROPHILS: 49 % (ref 36–65)
SEGMENTED NEUTROPHILS ABSOLUTE COUNT: 2.71 K/UL (ref 1.5–8.1)
SODIUM BLD-SCNC: 136 MMOL/L (ref 135–144)
WBC # BLD: 5.6 K/UL (ref 3.5–11.3)

## 2022-08-11 PROCEDURE — 2500000003 HC RX 250 WO HCPCS: Performed by: STUDENT IN AN ORGANIZED HEALTH CARE EDUCATION/TRAINING PROGRAM

## 2022-08-11 PROCEDURE — 2709999900 HC NON-CHARGEABLE SUPPLY

## 2022-08-11 PROCEDURE — C1764 EVENT RECORDER, CARDIAC: HCPCS

## 2022-08-11 PROCEDURE — 85025 COMPLETE CBC W/AUTO DIFF WBC: CPT

## 2022-08-11 PROCEDURE — 6370000000 HC RX 637 (ALT 250 FOR IP): Performed by: PSYCHIATRY & NEUROLOGY

## 2022-08-11 PROCEDURE — 36415 COLL VENOUS BLD VENIPUNCTURE: CPT

## 2022-08-11 PROCEDURE — 6360000002 HC RX W HCPCS

## 2022-08-11 PROCEDURE — 99233 SBSQ HOSP IP/OBS HIGH 50: CPT | Performed by: PSYCHIATRY & NEUROLOGY

## 2022-08-11 PROCEDURE — 93312 ECHO TRANSESOPHAGEAL: CPT

## 2022-08-11 PROCEDURE — 33285 INSJ SUBQ CAR RHYTHM MNTR: CPT

## 2022-08-11 PROCEDURE — 80048 BASIC METABOLIC PNL TOTAL CA: CPT

## 2022-08-11 PROCEDURE — 0JH632Z INSERTION OF MONITORING DEVICE INTO CHEST SUBCUTANEOUS TISSUE AND FASCIA, PERCUTANEOUS APPROACH: ICD-10-PCS | Performed by: INTERNAL MEDICINE

## 2022-08-11 PROCEDURE — 99232 SBSQ HOSP IP/OBS MODERATE 35: CPT | Performed by: PSYCHIATRY & NEUROLOGY

## 2022-08-11 PROCEDURE — B246ZZ4 ULTRASONOGRAPHY OF RIGHT AND LEFT HEART, TRANSESOPHAGEAL: ICD-10-PCS | Performed by: INTERNAL MEDICINE

## 2022-08-11 PROCEDURE — 93325 DOPPLER ECHO COLOR FLOW MAPG: CPT

## 2022-08-11 RX ORDER — LABETALOL HYDROCHLORIDE 5 MG/ML
10 INJECTION, SOLUTION INTRAVENOUS
Status: DISCONTINUED | OUTPATIENT
Start: 2022-08-11 | End: 2022-08-11 | Stop reason: HOSPADM

## 2022-08-11 RX ORDER — LOSARTAN POTASSIUM 50 MG/1
50 TABLET ORAL DAILY
Qty: 30 TABLET | Refills: 0 | Status: SHIPPED | OUTPATIENT
Start: 2022-08-12

## 2022-08-11 RX ORDER — ATORVASTATIN CALCIUM 80 MG/1
80 TABLET, FILM COATED ORAL NIGHTLY
Qty: 30 TABLET | Refills: 0 | Status: SHIPPED | OUTPATIENT
Start: 2022-08-11

## 2022-08-11 RX ORDER — ASPIRIN 81 MG/1
81 TABLET, CHEWABLE ORAL DAILY
Qty: 30 TABLET | Refills: 3 | Status: SHIPPED | OUTPATIENT
Start: 2022-08-12

## 2022-08-11 RX ADMIN — ASPIRIN 81 MG: 81 TABLET, CHEWABLE ORAL at 09:34

## 2022-08-11 RX ADMIN — AMLODIPINE BESYLATE 10 MG: 10 TABLET ORAL at 09:34

## 2022-08-11 RX ADMIN — Medication 10 MG: at 07:12

## 2022-08-11 RX ADMIN — LOSARTAN POTASSIUM 50 MG: 50 TABLET, FILM COATED ORAL at 09:34

## 2022-08-11 RX ADMIN — Medication 10 MG: at 05:11

## 2022-08-11 ASSESSMENT — PAIN SCALES - GENERAL: PAINLEVEL_OUTOF10: 0

## 2022-08-11 NOTE — PROGRESS NOTES
Patient placed in holding area attached to monitor per RN will continue to monitor patient as we wait for PICC team to place IV for procedure.

## 2022-08-11 NOTE — DISCHARGE INSTRUCTIONS
Follow up with primary care doctor. Follow up with cardiologist.  Follow up with Dr. Amando Templeton and Dr. Amando Templeton, see follow up section for phone numbers. Please feel free return to the hospital if your symptoms worsen or any new concerning symptoms develop. Follow-up with your primary care physician as needed for all other the concerns.

## 2022-08-11 NOTE — PROGRESS NOTES
Physical Therapy        Physical Therapy Cancel Note      DATE: 2022    NAME: Colby Parra  MRN: [id number]   : 1955      Patient not seen this date for Physical Therapy due to: Other: Pt currently off the floor for ANNIKA, will check back as able.       Electronically signed by Tabby Martinez PTA on 2022 at 11:54 AM

## 2022-08-11 NOTE — PROCEDURES
[facility]  Transesophageal Echocardiogram and Cardioversion       Today's Date:  8/11/2022  Ordering Physician:    Indication:   CVA    Operators:  Primary:   Dr. Scot Magdaleno (Attending Physician)  Assistant:   Dawn Telles MD (Cardiovascular Fellow)    Pre Procedure Conscious Sedation Data:    ASA Class:    [] I [] II [x] III [] IV    Mallampati Class:  [x] I [] II [] III [] IV    Patient seen and examined. History and Physical reviewed. Labs reviewed. After informed consent was obtained with explanation of the risks and benefits, the patient was brought to Cath lab. All sedation was administered by the cardiologist. The oropharynx was pre anesthetisized with cetacaine spray. ANNIKA:    Structures:    LA:  Normal  NITZA:  No thrombus  RA:  Normal. Prominent eustachian valve. RV:   Normal  LV:  Normal  Estimated LVEF: 55%  Aorta:   Mild atheromatous disease arch  Pericardium: No pericardial effusion  Septum:  No intracardiac shunt via color Doppler. No intracardiac shunt via injection of agitated saline. Valves:    Mitral Valve:  Structurally normal. Moderate  regurgitation is identified. Aortic Valve: The aortic valve is trileaflet and opens adequately. No  regurgitiation is identified. Tricuspid valve: Structurally normal. Mild  regurgitation is identified. Pulmonary valve: Normal. No significant regurgitation    No valvular vegetations or thrombus identified. ANNIKA Summary:     1. A ANNIKA was performed without complications. 2. LVEF 50 %   3. No thrombus or valvular vegetation identified. NITZA- No evidence of thrombus   4  No intracardiac shunt via color Doppler. No intracardiac shunt via injection of agitated saline. 5  Structurally normal mitral valve. Moderate regurgitation noted.    6 . Proceed with ILR         Electronically signed by Dawn Telles MD on 8/11/2022 at 12:54 PM  Cardiovascular Diseases Fellow  [facility]    Attending Physician Statement  I have discussed the case of Livia Stone including pertinent history and exam findings with the resident. I have seen and examined the patient and the key elements of the encounter have been performed by me. I agree with the assessment, plan and orders as documented by the resident With changes made to the note.   I was present during entire procedure and performed all critical elements of the procedure    Electronically signed by Lesa Awad MD on 8/11/2022 at 2:33 PM.    [facility]      [phone]

## 2022-08-11 NOTE — PROGRESS NOTES
Daily Progress Note  Neuro Critical Care    Patient Name: Catie Santoyo  Patient : 1955  Room/Bed: 0128/0128-01  Code Status: Full Code  Allergies: No Known Allergies    CHIEF COMPLAINT:     Slurred speech     INTERVAL HISTORY    Initial Presentation (Admitted ): The patient is a 77 y.o. male presented with PMH of hypertension, acute onset of right arm and leg weakness along with impaired speech. Patient was last known well at approximately 1255. According to documents, patient went to the restroom at his workplace and did not come out. Workers found the patient on the commode not moving the right side and not able to communicate. Mobile stroke unit was called and patient subsequently received TKA at approximately 2 PM.  NIH was roughly 17. CTA of the head and neck showed a left 1 occlusion which was initially noted on CT head with a left hyperdense sign. Patient was taken for emergent thrombectomy. TICI 3, first-pass. Postprocedure, patient does display mild aphasia. He does also display subtle right sided weakness. No history of stroke. He is a daily smoker for over 40 years. Unclear if patient is on any antiplatelet therapy. Also does not recall what transpired and appears mildly confused. Patient is fully functional and able to perform his daily activities without any impairment. Hospital Course:   : emergent thrombectomy left MCA M1 segment based on CTA with left occlusion. Received TNk.  : MRI acute infarct L basal ganglia, and right frontal lobe/bilat parietal lobes/left temporal lobes suggesting embolic process. Sheath removed by endovascular team.  8/10: PT/OT eval, art line removed. ASA and lovenox started. Last 24h:  No acute events overnight.   Plan for ANNIKA and loop placement today    CURRENT MEDICATIONS:  SCHEDULED MEDICATIONS:   aspirin  81 mg Oral Daily    enoxaparin  40 mg SubCUTAneous Daily    amLODIPine  10 mg Oral Daily    nadolol  40 mg Oral Daily    atorvastatin  80 mg Oral Nightly    losartan  50 mg Oral Daily     CONTINUOUS INFUSIONS:      PRN MEDICATIONS:   sodium chloride flush, acetaminophen, ondansetron **OR** ondansetron, labetalol, LORazepam    VITALS:  Temperature Range: Temp: 97.9 °F (36.6 °C) Temp  Av.2 °F (36.8 °C)  Min: 97.9 °F (36.6 °C)  Max: 98.4 °F (36.9 °C)  BP Range: Systolic (76DOL), MZQ:679 , Min:88 , NFB:329     Diastolic (82URN), DKV:89, Min:53, Max:120    Pulse Range: Pulse  Av.1  Min: 54  Max: 72  Respiration Range: Resp  Av.1  Min: 16  Max: 28  Current Pulse Ox: SpO2: 93 %  24HR Pulse Ox Range: SpO2  Av.6 %  Min: 89 %  Max: 95 %  Patient Vitals for the past 12 hrs:   BP Temp Temp src Pulse Resp SpO2   22 0715 136/78 -- -- 55 20 93 %   22 0700 (!) 145/89 -- -- 57 19 93 %   22 0600 133/71 -- -- 56 19 92 %   22 0520 134/81 -- -- 54 20 93 %   22 0500 (!) 142/77 -- -- 56 20 94 %   22 0400 134/75 -- -- 59 20 93 %   22 0300 126/75 97.9 °F (36.6 °C) Oral 55 17 94 %   22 0200 (!) 104/53 -- -- 59 17 92 %   22 0100 (!) 100/55 -- -- 62 20 92 %   22 0000 118/62 -- -- 59 19 92 %   08/10/22 2300 135/73 98.4 °F (36.9 °C) Oral 60 21 92 %   08/10/22 2200 128/64 -- -- 60 22 93 %         RECENT LABS:   Lab Results   Component Value Date    WBC 5.6 2022    HGB 13.6 2022    HCT 38.9 (L) 2022    PLT 71 (L) 2022    CHOL 149 2022    TRIG 66 2022    HDL 53 2022     2022    K 4.2 2022     (H) 2022    CREATININE 0.50 (L) 2022    BUN 13 2022    CO2 20 2022    INR 1.1 2022    LABA1C 4.7 2022     24 HOUR INTAKE/OUTPUT:  Intake/Output Summary (Last 24 hours) at 2022 0900  Last data filed at 2022 0500  Gross per 24 hour   Intake 2654.51 ml   Output 1500 ml   Net 1154.51 ml       Labs and Images reviewed with:  [] Dr. Jyoti Shafer.  Yazan    [x] Dr. Az Araujo  [] Dr. Reji Mckeon Starla Collins  [] There are no new interval images to review. PHYSICAL EXAM       CONSTITUTIONAL:  Well developed, well nourished, alert and oriented x 3, in no acute distress. GCS 15. Nontoxic. No dysarthria. No aphasia. HEAD:  normocephalic, atraumatic    EYES:  PERRLA, EOMI. Visual Acuity and Peripheral vision in tact b/l   ENT:  moist mucous membranes   NECK:  supple, symmetric   LUNGS:  Equal air entry bilaterally   CARDIOVASCULAR:  normal s1 / s2, RRR, distal pulses intact   ABDOMEN:  Soft, no rigidity   NEUROLOGIC:  Mental Status:  A & O x3,awake     Tone:  normal  Sensory:    Touch:    Right Upper Extremity:  normal  Left Upper Extremity:  normal  Right Lower Extremity:  normal  Left Lower Extremity:  normal  Plantar Response:  Right:  downgoing  Left:  downgoing    Clonus:  absent         DRAINS:  [x] There are no drains for Neuro Critical Care to monitor at this time. ASSESSMENT AND PLAN:       NEUROLOGIC:  - Imaging MRI today at 24 hour caroline, if normal will plan to start ASA 81mg and lovenox  - AEDs none  - Goal SBP < 140 for now    CARDIOVASCULAR:  BP Range: Systolic (10VGY), ZMR:083 , Min:88 , GBN:539     Diastolic (33AGU), BU, Min:53, Max:120    Pulse Range: Pulse  Av.1  Min: 54  Max: 72  - Goal SBP < 140  Start home losartan  Lipitor  ASA and lovenox started  ANNIKA today and loop replacement    PULMONARY:  Respiration Range: Resp  Av.1  Min: 16  Max: 28  Current Pulse Ox: SpO2: 93 %  24HR Pulse Ox Range: SpO2  Av.6 %  Min: 89 %  Max: 95 %      RENAL/FLUID/ELECTROLYTE:  - BUN 13/ Creatinine 0.61  - I/O: In: 2894.5 [P.O.:830;  I.V.:1819.8]  Out: 1700 [Urine:1700]  - IVF: none    GI/NUTRITION:  NUTRITION:  Diet NPO  - Bowel regimen: none  - GI prophylaxis: none    ID:  Tmax: Temp (24hrs), Av.2 °F (36.8 °C), Min:97.9 °F (36.6 °C), Max:98.4 °F (36.9 °C)    Temperature Range: Temp: 97.9 °F (36.6 °C) Temp  Av.2 °F (36.8 °C)  Min: 97.9 °F (36.6 °C)  Max: 98.4 °F (36.9 °C)  - WBC

## 2022-08-11 NOTE — PROGRESS NOTES
Endovascular Neurosurgery Progress Note    SUBJECTIVE:   No acute events overnight, patient denied any headache overnight     Review of Systems:  CONSTITUTIONAL:  negative for fevers, chills, fatigue and malaise    EYES:  negative for double vision, blurred vision and photophobia     HEENT:  negative for tinnitus, epistaxis and sore throat    RESPIRATORY:  negative for cough, shortness of breath, wheezing    CARDIOVASCULAR:  negative for chest pain, palpitations, syncope, edema    GASTROINTESTINAL:  negative for nausea, vomiting    GENITOURINARY:  negative for incontinence    MUSCULOSKELETAL:  negative for neck or back pain    NEUROLOGICAL:  Negative for weakness and tingling  negative for headaches and dizziness    PSYCHIATRIC:  negative for anxiety      Review of systems otherwise negative. OBJECTIVE:     Vitals:    08/11/22 0715   BP: 136/78   Pulse: 55   Resp: 20   Temp:    SpO2: 93%        General:  Gen: normal habitus, NAD  HEENT: NCAT, mucosa moist  Cvs: RRR, S1 S2 normal  Resp: symmetric unlabored breathing  Abd: s/nd/nt  Ext: no edema  Skin: no lesions seen, warm and dry    Neuro:  Gen: awake and alert, oriented x3. Lang/speech: some impaired fluency, no aphasia or dysarthria. Follows commands. CN: PERRL, EOMI, VFF, V1-3 intact,Right facial nasolabial fold flattening, hearing intact, shoulder shrug symmetric, tongue midline  Motor: Right UE and LE 4/5, Left UE and LE 5/5   Sense: LT intact in all 4 ext. Coord: FTN and HTS intact b/l  DTR: deferred  Gait: narrow base gait    NIH Stroke Scale:   1a  Level of consciousness: 0 - alert; keenly responsive   1b. LOC questions:  0 - answers both questions correctly   1c. LOC commands: 0 - performs both tasks correctly   2. Best Gaze: 0 - normal   3. Visual: 0 - no visual loss   4. Facial Palsy: 1 - minor paralysis (flattened nasolabial fold, asymmetric on smiling)   5a.  Motor left arm: 0 - no drift, limb holds 90 (or 45) degrees for full 10 seconds 5b.  Motor right arm: 1 - drift, limb holds 90 (or 45) degrees but drifts down before full 10 seconds: does not hit bed   6a. Motor left le - no drift; leg holds 30 degree position for full 5 seconds   6b  Motor right le - drift; leg falls by the end of the 5 second period but does not hit bed   7. Limb Ataxia: 0 - absent   8. Sensory: 0 - normal; no sensory loss   9. Best Language:  0 - no aphasia, normal   10. Dysarthria: 1 - mild to moderate, patient slurs at least some words and at worst, can be understood with some difficulty   11. Extinction and Inattention: 0 - no abnormality         Total:   4     MRS: 04      LABS:   Reviewed. Lab Results   Component Value Date    HGB 13.6 2022    WBC 5.6 2022    PLT 71 (L) 2022     2022    BUN 13 2022    CREATININE 0.50 (L) 2022    APTT 22.7 2022    INR 1.1 2022      No results found for: COVID19    RADIOLOGY:   Images were personally reviewed including:   MRI brain:   1. Acute infarction in the left basal ganglia. 2. Additional small foci of acute infarction involving the right frontal   lobe, bilateral parietal lobes, and left temporal lobe, suggesting an embolic   process. 3. Microangiopathic change. IR:   Left MCA proximal M-1 occlusion TICI score 0  The above lesion was treated with 8 fr short sheath, Emboguard BGC, Rebar microcatheter and Embo trap 6.5 mm x 45 mm with mechanical aspiration using Penumbra engine, 1 Pass  Post thrombectomy Left ICA vasospasm was treated with IA nitroglycerine, IA nicardipine gtt, TICI score-03  CTA head and neck:   Occlusion of the M1 segment of the left MCA starting from its origin, with a   few patent M2 and distal branches in the left MCA territory. This finding is   likely related to acute thrombus.        Subtle decreased gray-white differentiation in the left MCA territory,   particularly in the left basal ganglia and along the left insula cortex,   likely

## 2022-08-11 NOTE — PROGRESS NOTES
1330 - Patient returned from cath lab. Wife in room, patient very anxious for discharge. 1539 - Patient discharged to home. Ambulatory off unit with wife. Refused wheelchair.

## 2022-08-11 NOTE — PROCEDURES
[facility]                Procedure Note  LOOP RECORDER IMPLANT      Sunshine Mendoza (34 y.o., male)  1955 8/11/2022      Procedure: Loop Recorder Implant    Operators:  Primary: Dr. Kesha Scott     Assistant/CV Fellow: Radha Yates     Pre Procedure Conscious Sedation Data:    ASA Class:    [] I [x] II [] III [] IV    Mallampati Class:  [] I [x] II [] III [] IV    Indication:  CVA / Arrhythmia    Device / Data:       Model # Serial #   Recorder [id number]       Sedation monitoring  Loop recorder Implant          Procedure  After the usual preparation of the left neck and chest, the patient was draped in the usual sterile manner. Local anesthesia was infused below the left clavicle from the midline laterally. An incision was made below the left breast, lateral to midline. The incision was dilated. The Loop recorder (Reveal) System was advanced using the standard techniques, and positioned successfully with no bleeding or compliaction    Impression / Device:  Successful Implantation of: Reveal / Loop Recorder  Estimated blood loss less than 5 ml     Plan:    Discharge if patient remains stable        Electronically signed by Radha Yates MD on 8/11/2022 at 12:57 PM      Attending Physician Statement  I have discussed the case of Sunshine Mendoza including pertinent history and exam findings with the resident. I have seen and examined the patient and the key elements of the encounter have been performed by me. I agree with the assessment, plan and orders as documented by the resident With changes made to the note.   I was present during entire procedure and performed all critical elements of the procedure    Electronically signed by Jamar Herrera MD on 8/11/2022 at 2:32 PM.    [facility]      [phone]

## 2022-08-11 NOTE — DISCHARGE SUMMARY
Neuro Critical Care   Discharge Summary      PATIENT NAME: Que David  YOB: 1955  MEDICAL RECORD NO. [id number]  DATE: 8/20/2022  PRIMARY CARE PHYSICIAN: No primary care provider on file. DISCHARGE DATE:  8/11/2022  DISCHARGE DIAGNOSIS:   Patient Active Problem List   Diagnosis Code    Stroke aborted by administration of thrombolytic agent ([facility] [address] I63.9    Ischemic stroke ([facility] [address] I63.9    Cerebrovascular accident (CVA) [facility]) I63.9       Mario Nick is a 77 y.o. yo male who presented to [facility] on 8/8/2022  2:23 PM with slurred speech. Initial Presentation (Admitted 8/8): The patient is a 77 y.o. male presented with PMH of hypertension, acute onset of right arm and leg weakness along with impaired speech. Patient was last known well at approximately 1255. According to documents, patient went to the restroom at his workplace and did not come out. Workers found the patient on the commode not moving the right side and not able to communicate. Mobile stroke unit was called and patient subsequently received TKA at approximately 2 PM.  NIH was roughly 17. CTA of the head and neck showed a left 1 occlusion which was initially noted on CT head with a left hyperdense sign. Patient was taken for emergent thrombectomy. TICI 3, first-pass. Postprocedure, patient does display mild aphasia. He does also display subtle right sided weakness. No history of stroke. He is a daily smoker for over 40 years. Unclear if patient is on any antiplatelet therapy. Also does not recall what transpired and appears mildly confused. Patient is fully functional and able to perform his daily activities without any impairment. Hospital Course:   8/8: emergent thrombectomy left MCA M1 segment based on CTA with left occlusion. Received TNk.  8/9: MRI acute infarct L basal ganglia, and right frontal lobe/bilat parietal lobes/left temporal lobes suggesting embolic process.  Sheath removed by endovascular team.  8/10: PT/OT eval, art line removed. ASA and lovenox started. Last 24h:  No acute events overnight. Plan for ANNIKA and loop placement today       Labs and imaging were followed daily. At time of discharge, Ariel Ellison was tolerating a No diet orders on file, having bowel movements, and is in stable condition to be discharged to home. PROCEDURES:    Loop recorder implant  ANNIKA  Right groin access closure    PHYSICAL EXAMINATION        Discharge Vitals:  height is 6' 1\" (1.854 m) and weight is 250 lb (113.4 kg). His temperature is 57.2 °F (14 °C) (abnormal). His blood pressure is 112/66 and his pulse is 60. His respiration is 18 and oxygen saturation is 91%. CONSTITUTIONAL:  Well developed, well nourished, alert and oriented x 3, in no acute distress. GCS 15. Nontoxic. No dysarthria. No aphasia. HEAD:  normocephalic, atraumatic    EYES:  PERRLA, EOMI. Visual Acuity and Peripheral vision in tact b/l   ENT:  moist mucous membranes   NECK:  supple, symmetric   LUNGS:  Equal air entry bilaterally   CARDIOVASCULAR:  normal s1 / s2, RRR, distal pulses intact   ABDOMEN:  Soft, no rigidity   NEUROLOGIC:  Mental Status:  A & O x3,awake     Tone:  normal  Sensory:    Touch:    Right Upper Extremity:  normal  Left Upper Extremity:  normal  Right Lower Extremity:  normal  Left Lower Extremity:  normal  Plantar Response:  Right:  downgoing  Left:  downgoing     Clonus:  absent            LABS/IMAGING     No results for input(s): WBC, HGB, HCT, PLT, NA, K, CL, CO2, BUN, CREATININE in the last 72 hours. CT HEAD WO CONTRAST    Result Date: 8/8/2022  EXAMINATION: CT OF THE HEAD WITHOUT CONTRAST  8/8/2022 2:30 pm TECHNIQUE: CT of the head was performed without the administration of intravenous contrast. Automated exposure control, iterative reconstruction, and/or weight based adjustment of the mA/kV was utilized to reduce the radiation dose to as low as reasonably achievable.  COMPARISON: None. HISTORY: ORDERING SYSTEM PROVIDED HISTORY: stroke TECHNOLOGIST PROVIDED HISTORY: stroke Decision Support Exception - unselect if not a suspected or confirmed emergency medical condition->Emergency Medical Condition (MA) FINDINGS: BRAIN/VENTRICLES: There is positive dense MCA sign in the M1 segment of the left MCA. There is no acute intracranial hemorrhage, mass effect or midline shift. No abnormal extra-axial fluid collection. The gray-white differentiation is maintained without evidence of an acute infarct. There is no evidence of hydrocephalus. ORBITS: The visualized portion of the orbits demonstrate no acute abnormality. SINUSES: The visualized paranasal sinuses and mastoid air cells demonstrate no acute abnormality. SOFT TISSUES/SKULL:  No acute abnormality of the visualized skull or soft tissues. Positive dense MCA sign in the M1 segment of the left MCA, likely related to acute thrombus occlusion. Although gray-white differentiation is grossly maintained, there is likely acute ischemia in the left MCA territory. Results were reported to Dr. Rosa Finnegan at 2:47 p.m. on August 8, 2022. CT HEAD WO CONTRAST    Result Date: 8/8/2022  EXAMINATION: CT OF THE HEAD WITHOUT CONTRAST  8/8/2022 1:56 pm TECHNIQUE: CT of the head was performed without the administration of intravenous contrast. Automated exposure control, iterative reconstruction, and/or weight based adjustment of the mA/kV was utilized to reduce the radiation dose to as low as reasonably achievable. COMPARISON: None. HISTORY: ORDERING SYSTEM PROVIDED HISTORY: stroke TECHNOLOGIST PROVIDED HISTORY: stroke Decision Support Exception - unselect if not a suspected or confirmed emergency medical condition->Emergency Medical Condition (MA) FINDINGS: BRAIN/VENTRICLES: There is question of positive dense MCA sign in the M1 segment of the left MCA. There is no acute intracranial hemorrhage, mass effect or midline shift.   No abnormal extra-axial fluid collection. The gray-white differentiation is maintained without evidence of an acute infarct. There is no evidence of hydrocephalus. ORBITS: The visualized portion of the orbits demonstrate no acute abnormality. SINUSES: There are retention cysts versus polyps in the bilateral maxillary sinuses. The visualized paranasal sinuses and mastoid air cells demonstrate no acute abnormality. SOFT TISSUES/SKULL:  No acute abnormality of the visualized skull or soft tissues. Question of positive dense MCA sign in the M1 segment of the left MCA. Further evaluation with CTA head is recommended. Although gray-white differentiation is grossly maintained, early acute ischemia in the left MCA territory cannot be excluded. IR ANGIOGRAM CAROTID C EREBRAL BILATERAL    Result Date: 8/9/2022  Date of Service: 08/08/2022 Diagnosis: Left MCA Acute Ischemic Stroke with proximal Left M-1 occlusion Patient arrived to the angio suite at: 15:01 Puncture obtained at: 15:34 Vascular access was sutured in and secured at: 16:26 Total anesthesia time: 56 minutes Bixby: Sahara Mejia MD Procedures: 1. Right ultrasound guided femoral artery access 2. Mechanical thrombectomy of the Left M1 middle cerebral artery (MCA) with Stent retriever and Combined Suctioning 3. IA Nicardipine infusion and IA Nitroglycerine administration for vasospasm 4. Left Internal Carotid Artery (ICA) Cerebral Angiography 5. Left Common Carotid Artery (CCA) Cerebral Angiography 6. Right Common Femoral Artery (CFA) Angiogram Neurointerventionalist: Naseem Borden MD, MS Access: Right Common Femoral Artery Anesthesia: MAC anesthesia.  Fluoro time: 21.7 minutes Contrast amount: 48 cc of Visipaque-270 Indication and Clinical History: 78 y/o M with pmh for Htn, Alcohol abuse presented with acute?onset right arm, leg weakness as well as speech difficulty, initial NIHSS was 17, received IV TNK, CTA head showed left proximal M-1 occlusion, patient was therefore recommended to is normal antegrade filling of the distal internal carotid artery, ophthalmic artery, anterior cerebral artery the distal branches. TICI score 0 in left MCA territory. FIRST PASS MECHANICAL THROMBECTOMY OF LEFT M1 CLOT ([address]): The select catheter was removed and a Synchro2 microwire, Rebar18 microcatheter and Large bore catheter (reperfusion catheter) were advanced through the BGC. With roadmap assistance, a Synchro2 microwire, Rebar18 microcatheter were advanced through  the clot to the superior left M2, the microwire was removed, and a 6.5 mm x 45 mm Embotrap stent retriever was advanced under fluoroscopic guidance, unsheathed spanning the Left proximal M1 MCA clot, and allowed to integrate with the clot for at least 5 minutes. The microcatheter was \"stripped off. \" The reperfusion catheter was advanced through the balloon guide catheter over the stent retriever wire into the clot. The BGC was inflated. The reperfusion catheter was gently advanced and the stent retriever device was gently pulled back until resistance was met. The stent retriever and the reperfusion catheter were removed from the body as a unit while applying continuous aspiration from the balloon guide catheter side port (using manual aspiration through a 60 cc back lock syringe) as well as contact aspiration through the reperfusion catheter (using the [employer] engine suction). Thrombus was visualized on the stent retriever. Post- First Pass angiographic run: In this injection complete recanalization of the left MCA territory was visualized. TICI score -03. Vasospasm was noted in the left MCA inferior division M-2 segment as well as left ICA mid and distal cervical segment. Normal capillary and venous phase. 200 mcg of Nitroglycerine was then infused in the left ICA through BGC. IA nicardipine 10 mg infusion was also maintained through BGC.  Final Angiographic run interpretation 5 minutes after IA nitroglycerine administration, angiographic run was obtained. In this run, there was complete resolution of the left MCA inferior division vasospasm. Significant improvement in the left ICA cervical segment vasospasm. Final reperfusion  score TICI-03 The BGC was then removed, and digital subtraction angiography of the right CFA was performed in a frontal oblique projection from the short sheath. There was no evidence of arterial stenosis, dissection, or pseudoaneurysm. The arterial puncture site was well placed above the femoral bifurcation. The sheath was sutured and secured with Tegaderm with plans to remove next day. No immediate complications were experienced. Patient was re-examined after the procedure with no change noted in their neurologic examination, with distal pulses present. The patient was subsequently discharged from the neurointerventional suite to the neurointensive care unit. Impression: 1. Left MCA proximal M-1 occlusion TICI score 0 2. The above lesion was treated with 8 fr short sheath, Emboguard BGC, Rebar microcatheter and Embo trap 6.5 mm x 45 mm stent retriever with mechanical aspiration using [employer] engine, First pass. 3.  Post thrombectomy Left ICA vasospasm was treated with IA nitroglycerine, IA nicardipine gtt 4. Final reperfusion  TICI score-03? Dr. Evy Carl dictated this invasive procedure. Dr. Christiana Bolaños was present for all procedural and imaging components of this case. Examination was reviewed and reported findings confirmed and evaluated by Dr. Christiana Bolaños. CTA HEAD NECK W CONTRAST    Result Date: 8/8/2022  EXAMINATION: CTA OF THE HEAD AND NECK WITH CONTRAST 8/8/2022 2:30 pm: TECHNIQUE: CTA of the head and neck was performed with the administration of intravenous contrast. Multiplanar reformatted images are provided for review. MIP images are provided for review. Stenosis of the internal carotid arteries measured using NASCET criteria.  Automated exposure control, iterative reconstruction, and/or weight based adjustment of the mA/kV was utilized to reduce the radiation dose to as low as reasonably achievable. COMPARISON: Noncontrast CT head from earlier today HISTORY: ORDERING SYSTEM PROVIDED HISTORY: stroke TECHNOLOGIST PROVIDED HISTORY: stroke Decision Support Exception - unselect if not a suspected or confirmed emergency medical condition->Emergency Medical Condition (MA) Reason for Exam: stroke FINDINGS: CTA NECK: AORTIC ARCH/ARCH VESSELS: No dissection or arterial injury. No significant stenosis of the brachiocephalic or subclavian arteries. CAROTID ARTERIES: No dissection, arterial injury, or hemodynamically significant stenosis by NASCET criteria. VERTEBRAL ARTERIES: No dissection, arterial injury, or significant stenosis. SOFT TISSUES: There is a partially visualized 8.2 mm lung nodule in the right upper lobe (series 3, image 1). No cervical or superior mediastinal lymphadenopathy. The larynx and pharynx are unremarkable. No acute abnormality of the salivary and thyroid glands. BONES: No acute osseous abnormality. CTA HEAD: ANTERIOR CIRCULATION: There is occlusion of the M1 segment of the left MCA starting from its origin, with a few patent M2 and distal branches in the left MCA territory. No significant stenosis of the intracranial internal carotid, anterior cerebral, or middle cerebral arteries. No aneurysm. POSTERIOR CIRCULATION: No significant stenosis of the vertebral, basilar, or posterior cerebral arteries. No aneurysm. OTHER: No dural venous sinus thrombosis on this non-dedicated study. BRAIN: There is subtle decreased gray-white differentiation in the left MCA territory, particularly in the left basal ganglia and along the left insula cortex, likely related to acute ischemia. No mass effect or midline shift. No extra-axial fluid collection. Occlusion of the M1 segment of the left MCA starting from its origin, with a few patent M2 and distal branches in the left MCA territory.   This finding is likely related to acute thrombus. Subtle decreased gray-white differentiation in the left MCA territory, particularly in the left basal ganglia and along the left insula cortex, likely related to acute ischemia. Otherwise, no acute abnormality or flow-limiting stenosis in the remainder of the major arteries of the head and neck. Partially visualized 8.2 mm lung nodule in the right upper lobe. Follow-up recommendation is listed below. Results were reported to Dr. Danette Nava at 2:47 p.m. on August 8, 2022. RECOMMENDATIONS: Fleischner Society guidelines for follow-up and management of incidentally detected pulmonary nodules: Single Solid Nodule: Nodule size greater than 8 mm In a low-risk patient, consider CT, PET/CT, or tissue sampling at 3 months. In a high-risk patient, consider CT, PET/CT, or tissue sampling at 3 months. - Low risk patients include individuals with minimal or absent history of smoking and other known risk factors. - High risk patients include individuals with a history or smoking or known risk factors. Radiology 2017 http://pubs. rsna.org/doi/full/10.1148/radiol. 0476955143     MRI BRAIN W WO CONTRAST    Result Date: 8/9/2022  EXAMINATION: MRI OF THE BRAIN WITHOUT AND WITH CONTRAST  8/9/2022 2:00 pm TECHNIQUE: Multiplanar multisequence MRI of the head/brain was performed without and with the administration of intravenous contrast. COMPARISON: 08/08/2022 CT. HISTORY: ORDERING SYSTEM PROVIDED HISTORY: f/u s/p ischemic event TECHNOLOGIST PROVIDED HISTORY: f/u s/p ischemic event Reason for Exam: f/u s/p ischemic event FINDINGS: INTRACRANIAL STRUCTURES/VENTRICLES:  There is diffusion restriction in the left basal ganglia and periventricular white matter. There are additional small foci of diffusion restriction in the right frontal lobe, right parietal lobe, and left parietal lobe. There is also diffusion restriction in the medial left temporal lobe. No mass effect or midline shift.  No evidence of an acute intracranial hemorrhage. The ventricles and sulci are normal in size and configuration. The sellar/suprasellar regions appear unremarkable. The normal signal voids within the major intracranial vessels appear maintained. No abnormal focus of enhancement is seen within the brain. There is scattered foci of T2/FLAIR hyperintensity in the periventricular and subcortical white matter. There is FLAIR hyperintensity in the left basal ganglia. ORBITS: The visualized portion of the orbits demonstrate no acute abnormality. SINUSES: Scattered mucosal thickening in the paranasal sinuses. Right mastoid air cell effusion. BONES/SOFT TISSUES: The bone marrow signal intensity appears normal. The soft tissues demonstrate no acute abnormality. 1. Acute infarction in the left basal ganglia. 2. Additional small foci of acute infarction involving the right frontal lobe, bilateral parietal lobes, and left temporal lobe, suggesting an embolic process. 3. Microangiopathic change. DISCHARGE INSTRUCTIONS     Discharge Medications:        Medication List        START taking these medications      aspirin 81 MG chewable tablet  Take 1 tablet by mouth in the morning. atorvastatin 80 MG tablet  Commonly known as: LIPITOR  Take 1 tablet by mouth nightly     losartan 50 MG tablet  Commonly known as: COZAAR  Take 1 tablet by mouth in the morning.                Where to Get Your Medications        You can get these medications from any pharmacy    Bring a paper prescription for each of these medications  aspirin 81 MG chewable tablet  atorvastatin 80 MG tablet  losartan 50 MG tablet       Diet: No diet orders on file diet as tolerated  Activity: as tolerated  Follow-up:  F/up with Dr. Daniel German in 2 weeks and f/up with Claudia Aguilera in 3 months after discharge   Time Spent for discharge: 30 minutes    Brenda Garcia MD  Neuro Critical Care  8/20/2022, 3:34 AM

## 2022-08-12 NOTE — CARE COORDINATION
..Stroke Follow up Phone Call    Sukhi this is Ursula Gonzales from [facility] stroke team calling to see how you are doing since your d/c. Medication List        START taking these medications      aspirin 81 MG chewable tablet  Take 1 tablet by mouth in the morning. atorvastatin 80 MG tablet  Commonly known as: LIPITOR  Take 1 tablet by mouth nightly     losartan 50 MG tablet  Commonly known as: COZAAR  Take 1 tablet by mouth in the morning. Where to Get Your Medications        You can get these medications from any pharmacy    Bring a paper prescription for each of these medications  aspirin 81 MG chewable tablet  atorvastatin 80 MG tablet  losartan 50 MG tablet         Can you tell me what medications you are taking? [x]   Yes  []   No    Do you have any questions about your medications? []   Yes  [x]   No    All medications reviewed with patient yes    Do you have a follow up apt. With the neurologist?   [x]   Yes  []   No  Provided number to [facility] [phone]    Do you now your risk factors for stroke? [x]   Yes  []   No    Can you tell me the signs and symptoms of stroke? [x]   Yes  []   No  FAST instructions provided    Do if you know what to do if you were having signs/symptoms of stroke? [x]   Yes  []   No  Instructions to call 911 provided    Our goal is to provide the very best stroke care, on a scale of 1 to 10 with 10 as the very best who would you rank the care you received?10        What can we do better?  It was great

## 2022-09-02 ENCOUNTER — OFFICE VISIT (OUTPATIENT)
Dept: NEUROLOGY | Age: 67
End: 2022-09-02
Payer: MEDICARE

## 2022-09-02 VITALS
HEART RATE: 56 BPM | WEIGHT: 250 LBS | DIASTOLIC BLOOD PRESSURE: 74 MMHG | HEIGHT: 73 IN | BODY MASS INDEX: 33.13 KG/M2 | OXYGEN SATURATION: 98 % | SYSTOLIC BLOOD PRESSURE: 132 MMHG

## 2022-09-02 DIAGNOSIS — I63.419 CEREBROVASCULAR ACCIDENT (CVA) DUE TO EMBOLISM OF MIDDLE CEREBRAL ARTERY, UNSPECIFIED BLOOD VESSEL LATERALITY (HCC): Primary | ICD-10-CM

## 2022-09-02 DIAGNOSIS — I63.9 ISCHEMIC STROKE (HCC): ICD-10-CM

## 2022-09-02 PROCEDURE — G8417 CALC BMI ABV UP PARAM F/U: HCPCS | Performed by: STUDENT IN AN ORGANIZED HEALTH CARE EDUCATION/TRAINING PROGRAM

## 2022-09-02 PROCEDURE — 1111F DSCHRG MED/CURRENT MED MERGE: CPT | Performed by: STUDENT IN AN ORGANIZED HEALTH CARE EDUCATION/TRAINING PROGRAM

## 2022-09-02 PROCEDURE — 3017F COLORECTAL CA SCREEN DOC REV: CPT | Performed by: STUDENT IN AN ORGANIZED HEALTH CARE EDUCATION/TRAINING PROGRAM

## 2022-09-02 PROCEDURE — 1123F ACP DISCUSS/DSCN MKR DOCD: CPT | Performed by: STUDENT IN AN ORGANIZED HEALTH CARE EDUCATION/TRAINING PROGRAM

## 2022-09-02 PROCEDURE — G8427 DOCREV CUR MEDS BY ELIG CLIN: HCPCS | Performed by: STUDENT IN AN ORGANIZED HEALTH CARE EDUCATION/TRAINING PROGRAM

## 2022-09-02 PROCEDURE — 4004F PT TOBACCO SCREEN RCVD TLK: CPT | Performed by: STUDENT IN AN ORGANIZED HEALTH CARE EDUCATION/TRAINING PROGRAM

## 2022-09-02 PROCEDURE — 99215 OFFICE O/P EST HI 40 MIN: CPT | Performed by: STUDENT IN AN ORGANIZED HEALTH CARE EDUCATION/TRAINING PROGRAM

## 2022-09-02 NOTE — PROGRESS NOTES
Endovascular Neurosurgery Clinic Note      Reason for evaluation: Follow up after Left MCA thrombectomy     SUBJECTIVE:   History of Chief Complaint:    Mr. Catie Santoyo is a 76 y/o M with pmh for Htn, Alcohol abuse, recent admission for Left MCA stroke, left MCA thrombectomy presented today for first hospital follow up. Briefly, Mr. Isis Clark presented to [facility] on 08/18 after he was found to have acute onset right sided weakness and speech difficulty at his work place. Patient received [facility] tnK in [facility] and at [facility] [address] patient underwent emergent MT on 08/08/2022 with final TICI score 03. Patient had left basal ganglia ischemic stroke. Etiology of the stroke was undetermined during the hospital stay. Loop recorder was placed and patient was discharged home. Since discharge patient has been doing well. Patient's language has been significantly improved. Allergies  has No Known Allergies. Medications  Prior to Admission medications    Medication Sig Start Date End Date Taking? Authorizing Provider   aspirin 81 MG chewable tablet Take 1 tablet by mouth in the morning. 8/12/22  Yes Brenda Garcia MD   atorvastatin (LIPITOR) 80 MG tablet Take 1 tablet by mouth nightly 8/11/22  Yes Brenda Garcia MD   losartan (COZAAR) 50 MG tablet Take 1 tablet by mouth in the morning. 8/12/22  Yes Brenda Garcia MD    Scheduled Meds:  Continuous Infusions:  PRN Meds:.  Past Medical History   has a past medical history of Cirrhosis of liver ([facility] [address] Hepatitis C, Hyperlipidemia, and Hypertension. Past Surgical History   has no past surgical history on file. Social History   reports that he has been smoking cigarettes. He started smoking about 55 years ago. He has a 54.00 pack-year smoking history. He has never used smokeless tobacco.   reports current alcohol use of about 32.0 standard drinks per week. reports current drug use. Drug: Marijuana Charmayne Stai).   Family History  family history includes Alcohol Abuse in his brother; Heart Disease in his father. Review of Systems:  CONSTITUTIONAL:  negative for fevers, chills, fatigue and malaise    EYES:  negative for double vision, blurred vision and photophobia     HEENT:  negative for tinnitus, epistaxis and sore throat    RESPIRATORY:  negative for cough, shortness of breath, wheezing    CARDIOVASCULAR:  negative for chest pain, palpitations, syncope, edema    GASTROINTESTINAL:  negative for nausea, vomiting    GENITOURINARY:  negative for incontinence    MUSCULOSKELETAL:  negative for neck or back pain    NEUROLOGICAL:  Negative for weakness and tingling  negative for headaches and dizziness    PSYCHIATRIC:  negative for anxiety      Review of systems otherwise negative. OBJECTIVE:     Vitals:    22 1320   BP: 132/74   Pulse: 56   SpO2: 98%        General:  Gen: normal habitus, NAD  HEENT: NCAT, mucosa moist  Cvs: RRR, S1 S2 normal  Resp: symmetric unlabored breathing  Abd: s/nd/nt  Ext: no edema  Skin: no lesions seen, warm and dry    Neuro:  Gen: awake and alert, oriented x3. Lang/speech: no aphasia or dysarthria. Follows commands. CN: PERRL, EOMI, VFF, V1-3 intact, face symmetric, hearing intact, shoulder shrug symmetric, tongue midline  Motor: grossly 5/5 UE and LE b/l  Sense: LT intact in all 4 ext. Coord: FTN and HTS intact b/l  DTR: deferred  Gait: narrow base gait    NIH Stroke Scale:   1a  Level of consciousness: 0 - alert; keenly responsive   1b. LOC questions:  0 - answers both questions correctly   1c. LOC commands: 0 - performs both tasks correctly   2. Best Gaze: 0 - normal   3. Visual: 0 - no visual loss   4. Facial Palsy: 0 - normal symmetric movement   5a. Motor left arm: 0 - no drift, limb holds 90 (or 45) degrees for full 10 seconds   5b. Motor right arm: 0 - no drift, limb holds 90 (or 45) degrees for full 10 seconds   6a.  Motor left le - no drift; leg holds 30 degree position for full 5 seconds   6b  Motor right le - no drift; leg holds 30 degree position for full 5 seconds   7. Limb Ataxia: 0 - absent   8. Sensory: 0 - normal; no sensory loss   9. Best Language:  0 - no aphasia, normal   10. Dysarthria: 0 - normal   11. Extinction and Inattention: 0 - no abnormality         Total:   0     MRS: 01      LABS:   Reviewed. Lab Results   Component Value Date    HGB 13.6 08/11/2022    WBC 5.6 08/11/2022    PLT 71 (L) 08/11/2022     08/11/2022    BUN 13 08/11/2022    CREATININE 0.50 (L) 08/11/2022    APTT 22.7 08/08/2022    INR 1.1 08/08/2022      No results found for: COVID19    RADIOLOGY:   Images were personally reviewed including:  IR 08/08/2022:   1. Left MCA proximal M-1 occlusion TICI score 0   2. The above lesion was treated with 8 fr short sheath, Emboguard BGC, Rebar microcatheter and [employer] trap 6.5 mm x 45 mm stent retriever with mechanical aspiration using [email or web address] engine, First pass. 3.  Post thrombectomy Left ICA vasospasm was treated with IA nitroglycerine, IA nicardipine gtt   4. Final reperfusion  TICI score-03     MRI brain   1. Acute infarction in the left basal ganglia. 2. Additional small foci of acute infarction involving the right frontal   lobe, bilateral parietal lobes, and left temporal lobe, suggesting an embolic   process. 3. Microangiopathic change. ASSESSMENT:   78 y/o M with pmh for Htn, Alcohol abuse, recent admission for Left MCA stroke, left MCA thrombectomy presented today for first hospital follow up. Mechanical thrombectomy with Dr. Isabel Price on 08/08/2022 with final reperfusion score TICI-03. MRI brain showed left basal ganglia stroke. On neuro exam, patient has remarkable improvement. NIHSS was 0. Etiology of the stroke undetermined at this time, potentially cardio embolic,loop recorder in place.    PLAN:   --continue with ASA 81 mg   --continue with Lipitor   --Loop recorder in place to r/out A.Fib, f/up with cardiology   --SBP goal < 140 mm Hg   --Aggressive medical management including maintaining HbA1C< 6.0, LDL<70.   --F/up with  in 3 months     Case discussed with Dr. Jaime Campbell attending.     Carlie Gonzalez MD    Stroke, [facility]  [address]  Electronically signed 9/2/2022 at 2:06 PM

## 2022-09-19 RX ORDER — ATORVASTATIN CALCIUM 80 MG/1
TABLET, FILM COATED ORAL
Qty: 30 TABLET | Refills: 0 | OUTPATIENT
Start: 2022-09-19

## 2023-03-20 ENCOUNTER — OFFICE VISIT (OUTPATIENT)
Dept: NEUROLOGY | Age: 68
End: 2023-03-20
Payer: MEDICARE

## 2023-03-20 VITALS
BODY MASS INDEX: 32.72 KG/M2 | HEART RATE: 56 BPM | RESPIRATION RATE: 22 BRPM | DIASTOLIC BLOOD PRESSURE: 78 MMHG | TEMPERATURE: 98 F | SYSTOLIC BLOOD PRESSURE: 139 MMHG | OXYGEN SATURATION: 98 % | WEIGHT: 248 LBS

## 2023-03-20 DIAGNOSIS — I63.412 CEREBROVASCULAR ACCIDENT (CVA) DUE TO EMBOLISM OF LEFT MIDDLE CEREBRAL ARTERY (HCC): Primary | ICD-10-CM

## 2023-03-20 PROCEDURE — G8427 DOCREV CUR MEDS BY ELIG CLIN: HCPCS | Performed by: PSYCHIATRY & NEUROLOGY

## 2023-03-20 PROCEDURE — 4004F PT TOBACCO SCREEN RCVD TLK: CPT | Performed by: PSYCHIATRY & NEUROLOGY

## 2023-03-20 PROCEDURE — 3017F COLORECTAL CA SCREEN DOC REV: CPT | Performed by: PSYCHIATRY & NEUROLOGY

## 2023-03-20 PROCEDURE — G8484 FLU IMMUNIZE NO ADMIN: HCPCS | Performed by: PSYCHIATRY & NEUROLOGY

## 2023-03-20 PROCEDURE — G8417 CALC BMI ABV UP PARAM F/U: HCPCS | Performed by: PSYCHIATRY & NEUROLOGY

## 2023-03-20 PROCEDURE — 1123F ACP DISCUSS/DSCN MKR DOCD: CPT | Performed by: PSYCHIATRY & NEUROLOGY

## 2023-03-20 PROCEDURE — 99215 OFFICE O/P EST HI 40 MIN: CPT | Performed by: PSYCHIATRY & NEUROLOGY

## 2023-03-20 NOTE — PROGRESS NOTES
a 54.00 pack-year smoking history. He has never used smokeless tobacco.   reports current alcohol use of about 32.0 standard drinks per week. reports current drug use. Drug: Marijuana Marlene Bronwyn). Family History  family history includes Alcohol Abuse in his brother; Heart Disease in his father. Review of Systems:  CONSTITUTIONAL:  negative for fevers, chills, fatigue and malaise    EYES:  negative for double vision, blurred vision and photophobia     HEENT:  negative for tinnitus, epistaxis and sore throat    RESPIRATORY:  negative for cough, shortness of breath, wheezing    CARDIOVASCULAR:  negative for chest pain, palpitations, syncope, edema    GASTROINTESTINAL:  negative for nausea, vomiting    GENITOURINARY:  negative for incontinence    MUSCULOSKELETAL:  negative for neck or back pain    NEUROLOGICAL:  Negative for weakness and tingling  negative for headaches and dizziness    PSYCHIATRIC:  negative for anxiety      Review of systems otherwise negative. OBJECTIVE:     Vitals:    03/20/23 1550   BP: 139/78   Pulse: 56   Resp: 22   Temp: 98 °F (36.7 °C)   SpO2: 98%        General:  Gen: normal habitus, NAD  HEENT: NCAT, mucosa moist  Cvs: RRR, S1 S2 normal  Resp: symmetric unlabored breathing  Abd: s/nd/nt  Ext: no edema  Skin: no lesions seen, warm and dry    Neuro:  Gen: awake and alert, oriented x3. Lang/speech: no aphasia or dysarthria. Follows commands. CN: PERRL, EOMI, VFF, V1-3 intact, face symmetric, hearing intact, shoulder shrug symmetric, tongue midline  Motor: grossly 5/5 UE and LE b/l  Sense: LT intact in all 4 ext. Coord: FTN and HTS intact b/l  DTR: deferred  Gait: narrow base gait    NIH Stroke Scale:   1a  Level of consciousness: 0 - alert; keenly responsive   1b. LOC questions:  0 - answers both questions correctly   1c. LOC commands: 0 - performs both tasks correctly   2. Best Gaze: 0 - normal   3. Visual: 0 - no visual loss   4. Facial Palsy: 0 - normal symmetric movement   5a.

## 2023-10-06 PROBLEM — I10 BENIGN ESSENTIAL HTN: Status: ACTIVE | Noted: 2023-10-06

## 2023-10-06 PROBLEM — Z98.890 HISTORY OF LOOP RECORDER: Status: ACTIVE | Noted: 2023-10-06

## 2023-10-06 PROBLEM — I48.0 PAF (PAROXYSMAL ATRIAL FIBRILLATION) (HCC): Status: ACTIVE | Noted: 2023-10-06

## 2023-10-06 PROBLEM — Z79.01 ON APIXABAN THERAPY: Status: ACTIVE | Noted: 2023-10-06

## 2023-11-20 ENCOUNTER — TELEPHONE (OUTPATIENT)
Dept: NEUROSURGERY | Age: 68
End: 2023-11-20

## 2023-11-20 NOTE — TELEPHONE ENCOUNTER
Hi Dr Gomez,   I've spoken with the wife to the patient. She wanted to inform you that Mr. Skelton has lung cancer. Should they have to operate is everything good to go for him? Please advise.

## 2024-01-29 ENCOUNTER — TELEPHONE (OUTPATIENT)
Dept: NEUROLOGY | Age: 69
End: 2024-01-29

## 2024-01-29 NOTE — TELEPHONE ENCOUNTER
Central scheduling called again and stated that the pt is not coming in to get performed yet as they will not schedule without contrast because of past radiation. Need order changed to state MRA head w/ w/o contrast or the pt will not get MRA done before appt.     Discussed with Dr. Gomez as in office doing appts and he stated we need MRA to be performed so per Dr. Gomez change order to MRA Head with and without order.

## 2024-01-29 NOTE — TELEPHONE ENCOUNTER
Solange from central scheduling called and is needing clarification on MRA Head order. States pt has had radiation treatment recently and per their guidelines, any pt that has had radiation their imaging need to be with contrast. So MRA needs to be MRA Head w/ w/o contrast. Please change order so pt can be scheduled.

## 2024-02-01 NOTE — TELEPHONE ENCOUNTER
Spoke with Solange and advise per Dr. Olmstead-MRA does not need to have contrast, MRA head without contrast as it was ordered is good.

## 2024-03-02 ENCOUNTER — HOSPITAL ENCOUNTER (OUTPATIENT)
Dept: MRI IMAGING | Age: 69
End: 2024-03-02
Attending: PSYCHIATRY & NEUROLOGY
Payer: MEDICARE

## 2024-03-02 DIAGNOSIS — I63.412 CEREBROVASCULAR ACCIDENT (CVA) DUE TO EMBOLISM OF LEFT MIDDLE CEREBRAL ARTERY (HCC): ICD-10-CM

## 2024-03-02 PROCEDURE — 6360000004 HC RX CONTRAST MEDICATION: Performed by: PSYCHIATRY & NEUROLOGY

## 2024-03-02 PROCEDURE — A9579 GAD-BASE MR CONTRAST NOS,1ML: HCPCS | Performed by: PSYCHIATRY & NEUROLOGY

## 2024-03-02 PROCEDURE — 70546 MR ANGIOGRAPH HEAD W/O&W/DYE: CPT

## 2024-03-02 RX ADMIN — GADOTERIDOL 20 ML: 279.3 INJECTION, SOLUTION INTRAVENOUS at 10:45
